# Patient Record
Sex: FEMALE | Race: WHITE | NOT HISPANIC OR LATINO | Employment: OTHER | URBAN - METROPOLITAN AREA
[De-identification: names, ages, dates, MRNs, and addresses within clinical notes are randomized per-mention and may not be internally consistent; named-entity substitution may affect disease eponyms.]

---

## 2019-11-16 ENCOUNTER — APPOINTMENT (EMERGENCY)
Dept: RADIOLOGY | Facility: HOSPITAL | Age: 84
End: 2019-11-16
Payer: MEDICARE

## 2019-11-16 ENCOUNTER — APPOINTMENT (EMERGENCY)
Dept: CT IMAGING | Facility: HOSPITAL | Age: 84
End: 2019-11-16
Payer: MEDICARE

## 2019-11-16 ENCOUNTER — HOSPITAL ENCOUNTER (EMERGENCY)
Facility: HOSPITAL | Age: 84
Discharge: HOME | End: 2019-11-16
Attending: EMERGENCY MEDICINE
Payer: MEDICARE

## 2019-11-16 VITALS
OXYGEN SATURATION: 99 % | SYSTOLIC BLOOD PRESSURE: 198 MMHG | HEIGHT: 62 IN | DIASTOLIC BLOOD PRESSURE: 80 MMHG | BODY MASS INDEX: 19.32 KG/M2 | WEIGHT: 105 LBS | TEMPERATURE: 98.4 F | RESPIRATION RATE: 18 BRPM | HEART RATE: 72 BPM

## 2019-11-16 DIAGNOSIS — S62.109A WRIST FRACTURE: Primary | ICD-10-CM

## 2019-11-16 LAB
ANION GAP SERPL CALCULATED.3IONS-SCNC: 7 MMOL/L (ref 4–13)
APTT PPP: 32 SECONDS (ref 23–37)
ATRIAL RATE: 70 BPM
BASOPHILS # BLD AUTO: 0.1 THOUSANDS/ΜL (ref 0–0.1)
BASOPHILS NFR BLD AUTO: 0 % (ref 0–2)
BUN SERPL-MCNC: 27 MG/DL (ref 7–25)
CALCIUM SERPL-MCNC: 9.4 MG/DL (ref 8.6–10.5)
CHLORIDE SERPL-SCNC: 104 MMOL/L (ref 98–107)
CO2 SERPL-SCNC: 28 MMOL/L (ref 21–31)
CREAT SERPL-MCNC: 0.82 MG/DL (ref 0.6–1.2)
EOSINOPHIL # BLD AUTO: 0.1 THOUSAND/ΜL (ref 0–0.61)
EOSINOPHIL NFR BLD AUTO: 1 % (ref 0–5)
ERYTHROCYTE [DISTWIDTH] IN BLOOD BY AUTOMATED COUNT: 14 % (ref 11.5–14.5)
GFR SERPL CREATININE-BSD FRML MDRD: 63 ML/MIN/1.73SQ M
GLUCOSE SERPL-MCNC: 138 MG/DL (ref 65–99)
HCT VFR BLD AUTO: 41 % (ref 42–47)
HGB BLD-MCNC: 13.5 G/DL (ref 12–16)
INR PPP: 0.98 (ref 0.9–1.5)
LYMPHOCYTES # BLD AUTO: 0.8 THOUSANDS/ΜL (ref 0.6–4.47)
LYMPHOCYTES NFR BLD AUTO: 6 % (ref 21–51)
MCH RBC QN AUTO: 30 PG (ref 26–34)
MCHC RBC AUTO-ENTMCNC: 33 G/DL (ref 31–37)
MCV RBC AUTO: 91 FL (ref 81–99)
MONOCYTES # BLD AUTO: 0.9 THOUSAND/ΜL (ref 0.17–1.22)
MONOCYTES NFR BLD AUTO: 6 % (ref 2–12)
NEUTROPHILS # BLD AUTO: 11.9 THOUSANDS/ΜL (ref 1.4–6.5)
NEUTS SEG NFR BLD AUTO: 87 % (ref 42–75)
P AXIS: 51 DEGREES
PLATELET # BLD AUTO: 284 THOUSANDS/UL (ref 149–390)
PMV BLD AUTO: 7.7 FL (ref 8.6–11.7)
POTASSIUM SERPL-SCNC: 3.6 MMOL/L (ref 3.5–5.5)
PR INTERVAL: 150 MS
PROTHROMBIN TIME: 11.4 SECONDS (ref 10.2–13)
QRS AXIS: -9 DEGREES
QRSD INTERVAL: 66 MS
QT INTERVAL: 386 MS
QTC INTERVAL: 416 MS
RBC # BLD AUTO: 4.52 MILLION/UL (ref 3.9–5.2)
SODIUM SERPL-SCNC: 139 MMOL/L (ref 134–143)
T WAVE AXIS: 60 DEGREES
VENTRICULAR RATE: 70 BPM
WBC # BLD AUTO: 13.6 THOUSAND/UL (ref 4.8–10.8)

## 2019-11-16 PROCEDURE — 80048 BASIC METABOLIC PNL TOTAL CA: CPT | Performed by: EMERGENCY MEDICINE

## 2019-11-16 PROCEDURE — 73110 X-RAY EXAM OF WRIST: CPT

## 2019-11-16 PROCEDURE — 85610 PROTHROMBIN TIME: CPT | Performed by: EMERGENCY MEDICINE

## 2019-11-16 PROCEDURE — 73080 X-RAY EXAM OF ELBOW: CPT

## 2019-11-16 PROCEDURE — 71045 X-RAY EXAM CHEST 1 VIEW: CPT

## 2019-11-16 PROCEDURE — 93005 ELECTROCARDIOGRAM TRACING: CPT

## 2019-11-16 PROCEDURE — 36415 COLL VENOUS BLD VENIPUNCTURE: CPT | Performed by: EMERGENCY MEDICINE

## 2019-11-16 PROCEDURE — 96365 THER/PROPH/DIAG IV INF INIT: CPT

## 2019-11-16 PROCEDURE — 72125 CT NECK SPINE W/O DYE: CPT

## 2019-11-16 PROCEDURE — 85730 THROMBOPLASTIN TIME PARTIAL: CPT | Performed by: EMERGENCY MEDICINE

## 2019-11-16 PROCEDURE — 73030 X-RAY EXAM OF SHOULDER: CPT

## 2019-11-16 PROCEDURE — 93010 ELECTROCARDIOGRAM REPORT: CPT | Performed by: INTERNAL MEDICINE

## 2019-11-16 PROCEDURE — 96375 TX/PRO/DX INJ NEW DRUG ADDON: CPT

## 2019-11-16 PROCEDURE — 70450 CT HEAD/BRAIN W/O DYE: CPT

## 2019-11-16 PROCEDURE — 85025 COMPLETE CBC W/AUTO DIFF WBC: CPT | Performed by: EMERGENCY MEDICINE

## 2019-11-16 PROCEDURE — 99284 EMERGENCY DEPT VISIT MOD MDM: CPT

## 2019-11-16 PROCEDURE — 99285 EMERGENCY DEPT VISIT HI MDM: CPT | Performed by: EMERGENCY MEDICINE

## 2019-11-16 RX ORDER — CEFAZOLIN SODIUM 2 G/50ML
2000 SOLUTION INTRAVENOUS ONCE
Status: COMPLETED | OUTPATIENT
Start: 2019-11-16 | End: 2019-11-16

## 2019-11-16 RX ORDER — CEPHALEXIN 500 MG/1
500 CAPSULE ORAL EVERY 12 HOURS SCHEDULED
Qty: 14 CAPSULE | Refills: 0 | Status: SHIPPED | OUTPATIENT
Start: 2019-11-16 | End: 2019-11-23

## 2019-11-16 RX ADMIN — MORPHINE SULFATE 2 MG: 2 INJECTION, SOLUTION INTRAMUSCULAR; INTRAVENOUS at 06:25

## 2019-11-16 RX ADMIN — MORPHINE SULFATE 2 MG: 2 INJECTION, SOLUTION INTRAMUSCULAR; INTRAVENOUS at 06:13

## 2019-11-16 RX ADMIN — CEFAZOLIN SODIUM 2000 MG: 2 SOLUTION INTRAVENOUS at 06:14

## 2019-11-16 NOTE — ED PROVIDER NOTES
History  Chief Complaint   Patient presents with    Fall     Pt states she slipped and fell while getting up from chair, landing on her RT forearm  Pt c/o pain, swelling, and bleeding of RT wrist and pain of RT forearm  Sp fall from standing, she was reading new paper sand states she tripped over loose papers  She fell on the right  Co right wrist pain and bleeding at wrist  No head trauma, no headache, no sob, no cp  Denies abd pain, back or neck pain, focal neuro sx, uti sx, uri sx, fever  Not anticoagulated  No loc  Prior to Admission Medications   Prescriptions Last Dose Informant Patient Reported? Taking? LOSARTAN POTASSIUM PO 11/15/2019 at Unknown time  Yes Yes   Sig: Take by mouth 2 (two) times a day       Facility-Administered Medications: None       Past Medical History:   Diagnosis Date    Hypertension        Past Surgical History:   Procedure Laterality Date    CHOLECYSTECTOMY      HYSTERECTOMY         History reviewed  No pertinent family history  I have reviewed and agree with the history as documented  Social History     Tobacco Use    Smoking status: Never Smoker    Smokeless tobacco: Never Used   Substance Use Topics    Alcohol use: Never     Frequency: Never    Drug use: Never        Review of Systems   All other systems reviewed and are negative  Physical Exam  Physical Exam   Constitutional: She is oriented to person, place, and time  She appears well-developed and well-nourished  HENT:   Head: Normocephalic and atraumatic  Right Ear: External ear normal    Left Ear: External ear normal    Mouth/Throat: No oropharyngeal exudate  Eyes: Pupils are equal, round, and reactive to light  Conjunctivae and EOM are normal    Neck: Normal range of motion  Neck supple  No JVD present  Cardiovascular: Normal rate, regular rhythm, normal heart sounds and intact distal pulses  Exam reveals no gallop and no friction rub  No murmur heard    Pulmonary/Chest: Effort normal and breath sounds normal  No stridor  No respiratory distress  She has no wheezes  She has no rales  She exhibits no tenderness  Abdominal: Soft  Bowel sounds are normal  She exhibits no distension and no mass  There is no tenderness  There is no rebound and no guarding  No hernia  Musculoskeletal: She exhibits tenderness and deformity  Pelvis NT, chest wall NT, spine NT  There is a deformity to the right wrist, there is a small lac to the lateral wrist that is actively bleeding at site of deformity  bleeding is a ooz  There is no elbow tenderness  The shoulder is uniformly tender, the clavicle is NT  Neurological: She is alert and oriented to person, place, and time  No sensory deficit  She exhibits normal muscle tone  Skin: Skin is warm  Capillary refill takes less than 2 seconds  She is not diaphoretic  Psychiatric: She has a normal mood and affect         Vital Signs  ED Triage Vitals   Temperature Pulse Respirations Blood Pressure SpO2   11/16/19 0434 11/16/19 0434 11/16/19 0434 11/16/19 0439 11/16/19 0434   98 4 °F (36 9 °C) 74 18 (!) 206/82 98 %      Temp Source Heart Rate Source Patient Position - Orthostatic VS BP Location FiO2 (%)   11/16/19 0434 11/16/19 0434 -- 11/16/19 0439 --   Temporal Monitor  Left arm       Pain Score       11/16/19 0434       8           Vitals:    11/16/19 0434 11/16/19 0439 11/16/19 0600 11/16/19 0603   BP:  (!) 206/82 (!) 198/80    Pulse: 74  75 72         Visual Acuity      ED Medications  Medications   ceFAZolin (ANCEF) IVPB (premix) 2,000 mg (0 mg Intravenous Stopped 11/16/19 0645)   morphine injection 2 mg (2 mg Intravenous Given 11/16/19 0613)   morphine injection 2 mg (2 mg Intravenous Given 11/16/19 0625)       Diagnostic Studies  Results Reviewed     Procedure Component Value Units Date/Time    Basic metabolic panel [[id number]]  (Abnormal) Collected:  11/16/19 0511    Lab Status:  Final result Specimen:  Blood from Arm, Left Updated:  11/16/19 0616     Sodium 139 mmol/L      Potassium 3 6 mmol/L      Chloride 104 mmol/L      CO2 28 mmol/L      ANION GAP 7 mmol/L      BUN 27 mg/dL      Creatinine 0 82 mg/dL      Glucose 138 mg/dL      Calcium 9 4 mg/dL      eGFR 63 ml/min/1 73sq m     Narrative:       [place] guidelines for Chronic Kidney Disease (CKD):     Stage 1 with normal or high GFR (GFR > 90 mL/min/1 73 square meters)    Stage 2 Mild CKD (GFR = 60-89 mL/min/1 73 square meters)    Stage 3A Moderate CKD (GFR = 45-59 mL/min/1 73 square meters)    Stage 3B Moderate CKD (GFR = 30-44 mL/min/1 73 square meters)    Stage 4 Severe CKD (GFR = 15-29 mL/min/1 73 square meters)    Stage 5 End Stage CKD (GFR <15 mL/min/1 73 square meters)  Note: GFR calculation is accurate only with a steady state creatinine    CBC and differential [[id number]]  (Abnormal) Collected:  11/16/19 0511    Lab Status:  Final result Specimen:  Blood from Arm, Left Updated:  11/16/19 0541     WBC 13 60 Thousand/uL      RBC 4 52 Million/uL      Hemoglobin 13 5 g/dL      Hematocrit 41 0 %      MCV 91 fL      MCH 30 0 pg      MCHC 33 0 g/dL      RDW 14 0 %      MPV 7 7 fL      Platelets 267 Thousands/uL      Neutrophils Relative 87 %      Lymphocytes Relative 6 %      Monocytes Relative 6 %      Eosinophils Relative 1 %      Basophils Relative 0 %      Neutrophils Absolute 11 90 Thousands/µL      Lymphocytes Absolute 0 80 Thousands/µL      Monocytes Absolute 0 90 Thousand/µL      Eosinophils Absolute 0 10 Thousand/µL      Basophils Absolute 0 10 Thousands/µL     Protime-INR [[id number]]  (Normal) Collected:  11/16/19 0511    Lab Status:  Final result Specimen:  Blood from Arm, Left Updated:  11/16/19 0540     Protime 11 4 seconds      INR 0 98    APTT [[id number]]  (Normal) Collected:  11/16/19 0511    Lab Status:  Final result Specimen:  Blood from Arm, Left Updated:  11/16/19 0540     PTT 32 seconds                  XR wrist 3+ views RIGHT   Final Result by Pleasant Goodpasture,  (11/16 1421)      Stable alignment of the comminuted, impacted distal radius fracture  Workstation performed: [id number]         CT cervical spine without contrast   Final Result by Daisy You DO (11/16 0607)   Advanced degenerative changes of the cervical spine  No acute cervical spine fracture or traumatic malalignment  Workstation performed: [id number]         CT head without contrast   Final Result by Daisy You DO (11/16 3951)   Cerebral atrophy with chronic small vessel ischemic change  No acute intracranial abnormality  Workstation performed: [id number]         XR elbow 3+ views RIGHT   Final Result by Pippa Damon MD (11/16 7407)      No acute osseous abnormality  Workstation performed: [id number]         XR shoulder 2+ views RIGHT   Final Result by Pippa Damon MD (11/16 9845)      No acute osseous abnormality  Workstation performed: [id number]         XR wrist 3+ views RIGHT   Final Result by Pippa Damon MD (55/63 9125)      Complex distal radial fracture  Workstation performed: [id number]         XR chest 1 view portable   Final Result by Pippa Damon MD (14/89 8173)      No acute cardiopulmonary disease  Workstation performed: [id number]                    Procedures  Procedures       ED Course                               MDM  Number of Diagnoses or Management Options  Wrist fracture:   Diagnosis management comments: Pre op ekg- nsr at 70, no obvious / significant st deviation, normal axis and intervals  Non specific t wave flattening  Sp fall from standing, tripped over a new paper  Ct head and neck neg acute  With distal radius fx  There is a small oozing abrasion at the distal lateral wrist and I did consider this a open fx at first  xray reveals the fx and it appears the ooz of blood is alteral on the ulnar side and her fx and deformity are on the radial side   As there is a small wound at / near the deformity I have consulted ortho for further evaluation  Dr Eva Rodrigez has reviewed the films and is aware of clinci situation  She rec IV abx, keflex, splint and reduction via finger trap and states she will see in office  She rec keflex for home  Pain is controlled, abx started, I will give keflex for home  She will fu with ortho  Red flags discussed in detail and she voiced understanding  I will dc pt and I have asked on coming dr zamora to look at the post reduction film prior to her exiting  eER    I am a physician treating a patient in an emergency department  under circumstances when I reasonably  have determined that electronically prescribing a controlled substance would be impractical for the patient to obtain the controlled substance prescribed by electronic prescription or would cause an untimely delay resulting in an adverse impact on the patient's medical condition  norco given for home  Sugar tong placed, neurovascular remains intact  Shoulder pain, sling placed  She will fu with ortho  instructions voiced to family at bed side  Disposition  Final diagnoses:   Wrist fracture     Time reflects when diagnosis was documented in both MDM as applicable and the Disposition within this note     Time User Action Codes Description Comment    11/16/2019  7:20 AM Dylon Giang Add [[id number]] Wrist fracture       ED Disposition     ED Disposition Condition Date/Time Comment    Discharge Stable Sat Nov 16, 2019  7:21 AM Stephanie Herring discharge to home/self care              Follow-up Information     Follow up With Specialties Details Why Contact Info    Nam Cooper MD Orthopedic Surgery Schedule an appointment as soon as possible for a visit in 1 day  [address] [facility]  [phone]            Discharge Medication List as of 11/16/2019  7:37 AM      START taking these medications    Details   cephalexin (KEFLEX) 500 mg capsule Take 1 capsule (500 mg total) by mouth every 12 (twelve) hours for 7 days, Starting Sat 11/16/2019, Until Sat 11/23/2019, Normal         CONTINUE these medications which have NOT CHANGED    Details   LOSARTAN POTASSIUM PO Take by mouth 2 (two) times a day , Historical Med           No discharge procedures on file      ED Provider  Electronically Signed by           Kerrie Olivas MD  11/17/19 9037

## 2019-11-16 NOTE — DISCHARGE INSTRUCTIONS
You must return to er with uncontrolled pain, fever, weakness or sensation changes  Keep splint clean and dry until cleared by ortho, call for appointment  Return to er with inability to see ortho or with any other concerns

## 2019-11-22 ENCOUNTER — OFFICE VISIT (OUTPATIENT)
Dept: OBGYN CLINIC | Facility: CLINIC | Age: 84
End: 2019-11-22
Payer: MEDICARE

## 2019-11-22 ENCOUNTER — TELEPHONE (OUTPATIENT)
Dept: OBGYN CLINIC | Facility: HOSPITAL | Age: 84
End: 2019-11-22

## 2019-11-22 VITALS
HEIGHT: 62 IN | SYSTOLIC BLOOD PRESSURE: 155 MMHG | WEIGHT: 105 LBS | BODY MASS INDEX: 19.32 KG/M2 | DIASTOLIC BLOOD PRESSURE: 82 MMHG | HEART RATE: 76 BPM

## 2019-11-22 DIAGNOSIS — M25.531 RIGHT WRIST PAIN: Primary | ICD-10-CM

## 2019-11-22 DIAGNOSIS — S52.601A CLOSED FRACTURE OF DISTAL ENDS OF RIGHT RADIUS AND ULNA, INITIAL ENCOUNTER: ICD-10-CM

## 2019-11-22 DIAGNOSIS — S52.501A CLOSED FRACTURE OF DISTAL ENDS OF RIGHT RADIUS AND ULNA, INITIAL ENCOUNTER: ICD-10-CM

## 2019-11-22 PROCEDURE — 99203 OFFICE O/P NEW LOW 30 MIN: CPT | Performed by: ORTHOPAEDIC SURGERY

## 2019-11-22 RX ORDER — CEFAZOLIN SODIUM 1 G/50ML
1000 SOLUTION INTRAVENOUS ONCE
Status: CANCELLED | OUTPATIENT
Start: 2019-11-22 | End: 2019-11-22

## 2019-11-22 RX ORDER — OXYCODONE HYDROCHLORIDE AND ACETAMINOPHEN 5; 325 MG/1; MG/1
1 TABLET ORAL EVERY 4 HOURS PRN
Qty: 20 TABLET | Refills: 0 | Status: ON HOLD | OUTPATIENT
Start: 2019-11-22 | End: 2019-12-04 | Stop reason: SDUPTHER

## 2019-11-22 NOTE — PROGRESS NOTES
CHIEF COMPLAINT:  Chief Complaint   Patient presents with    Right Wrist - Pain       SUBJECTIVE:  Theodore Smiley is a 80y o  year old RHD female who presents with right wrist pain  Patient states on 11/16/2019 she had a fall from standing height  She states that she fell onto the outstretched hand  She denies hitting her head or any loss of consciousness  She went to the emergency room on 11/16/2019  She had x-rays which demonstrated a displaced distal radius fracture  She was reduced and was placed into a splint  She was instructed to follow up Orthopedics  Today she presents with pain and swelling over her right distal radius  She has difficulty with any motion  Her pain is constant  It is dull and achy  She denies any numbness or tingling  The patient denies any cardiac or pulmonary issues  Denies diabetes  Denies any history of MI, gastric ulcers, kidney or liver issues  Denies blood thinners  PAST MEDICAL HISTORY:  Past Medical History:   Diagnosis Date    Hypertension        PAST SURGICAL HISTORY:  Past Surgical History:   Procedure Laterality Date    CHOLECYSTECTOMY      HYSTERECTOMY         FAMILY HISTORY:  History reviewed  No pertinent family history      SOCIAL HISTORY:  Social History     Tobacco Use    Smoking status: Never Smoker    Smokeless tobacco: Never Used   Substance Use Topics    Alcohol use: Never     Frequency: Never    Drug use: Never       MEDICATIONS:    Current Outpatient Medications:     cephalexin (KEFLEX) 500 mg capsule, Take 1 capsule (500 mg total) by mouth every 12 (twelve) hours for 7 days, Disp: 14 capsule, Rfl: 0    LOSARTAN POTASSIUM PO, Take by mouth 2 (two) times a day , Disp: , Rfl:     oxyCODONE-acetaminophen (PERCOCET) 5-325 mg per tablet, Take 1 tablet by mouth every 4 (four) hours as needed for moderate painMax Daily Amount: 6 tablets, Disp: 20 tablet, Rfl: 0    ALLERGIES:  No Known Allergies    REVIEW OF SYSTEMS:  Review of Systems  ROS:   General: no fever, no chills  HEENT:  No loss of hearing or eyesight problems  Eyes:  No red eyes  Respiratory:  No coughing, shortness of breath or wheezing  Cardiovascular:  No chest pain, no palpitations  GI:  Abdomen soft nontender, denies nausea  Endocrine:  No muscle weakness, no frequent urination, no excessive thirst  Urinary:  No dysuria, no incontinence  Musculoskeletal: see HPI and PE  SKIN:  No skin rash, no dry skin  Neurological:  No headaches, no confusion  Psychiatric:  No suicide thoughts, no anxiety, no depression  Review of all other systems is negative    VITALS:  Vitals:    11/22/19 1004   BP: 155/82   Pulse: 76       LABS:  HgA1c: No results found for: HGBA1C  BMP:   Lab Results   Component Value Date    CALCIUM 9 4 11/16/2019    K 3 6 11/16/2019    CO2 28 11/16/2019     11/16/2019    BUN 27 (H) 11/16/2019    CREATININE 0 82 11/16/2019       _____________________________________________________  PHYSICAL EXAMINATION:  General: well developed and well nourished, alert, oriented times 3 and appears comfortable  Psychiatric: Normal  HEENT: Trachea Midline, No torticollis  Heart:  Regular rate and rhythm  Lungs:  Clear to auscultation  Pulmonary: No audible wheezing or respiratory distress   Skin: No masses, erythema, lacerations, fluctation, ulcerations  Neurovascular: Sensation Intact to the Median, Ulnar, Radial Nerve, Motor Intact to the Median, Ulnar, Radial Nerve and Pulses Intact    MUSCULOSKELETAL EXAMINATION:  Right wrist  No erythema noted, mild edema and ecchymosis noted over the distal radius, skin is warm to touch  Tender to palpation over the distal radius  She has decreased range of motion strength secondary to fracture  Patient is neurovascularly intact    ___________________________________________________  STUDIES REVIEWED:  Images obtained on 11/16/2019 of the Right hand 3 views demonstrate Displaced, impacted distal radius fracture        PROCEDURES PERFORMED:  Procedures  No Procedures performed today    _____________________________________________________  ASSESSMENT/PLAN:    Right impacted displaced distal radius fracture  - it was discussed with the patient that she will need surgical fixation in order to fix her distal radius fracture  Detail consent was obtained today for right distal radius ORIF   Surgery: right distal radius ORIF   Anesthesia:  Regional with sedation   Antibiotics:  Ordered   Medical clearance: not needed   Hand therapy: ordered   Consent: obtained   Post op pain medication sent to pharmacy today    Surgery medication instructions: You will stop eating and drinking at midnight the night before your surgery, but you may continue to take your normal medications with a small sip of water  In the morning on the day of your surgery, we would like you to take the following medications (as long as you have never been told to avoid Tylenol or NSAIDs like ibuprofen, Naproxen, Aleve, Advil, etc):   Ibuprofen 600mg one tablet by mouth   Tylenol 500mg one tablet by mouth    After surgery, we would like you to take Ibuprofen 600mg one tablet by mouth every 6 hours with food (at breakfast, lunch and dinner)  AND Tylenol 500 mg one tablet by mouth every 6 hours  (at breakfast, lunch and dinner) for 5-7 days after your surgery  Please take these medication EVERYDAY after surgery for 5-7 days, and not just as needed  You can take these medications at the same time  Taking these medications after surgery will limit your need for prescription pain medication  We will also prescribe a narcotic pain medication for a limited time after surgery that you can take as needed for moderate or severe pain         Diagnoses and all orders for this visit:    Right wrist pain  -     XR wrist 3+ vw right; Future    Closed fracture of distal ends of right radius and ulna, initial encounter  -     Ambulatory referral to PT/OT hand therapy; Future  -     Case request operating room: OPEN REDUCTION W/ INTERNAL FIXATION (ORIF) RADIUS right; Standing  -     Case request operating room: OPEN REDUCTION W/ INTERNAL FIXATION (ORIF) RADIUS right  -     oxyCODONE-acetaminophen (PERCOCET) 5-325 mg per tablet; Take 1 tablet by mouth every 4 (four) hours as needed for moderate painMax Daily Amount: 6 tablets    Other orders  -     Diet NPO; Sips with meds; Standing  -     Height and weight upon arrival; Standing  -     Void on call to OR; Standing  -     Insert peripheral IV; Standing  -     ceFAZolin (ANCEF) IVPB (premix) 1,000 mg        Follow Up:  Return for post op   Work/school status:  No use of the right upper extremity    To Do Next Visit:  Re-evaluation of current issue, X-rays of the  right  wrist and Sutures out    Operative Discussions:  Distal Radius Fracture Fixation: Both operative and non operative management of the injury was explained to the patient  The decision was made to undergo surgical fixation  The surgical procedure was explained with a verbal understanding expressed by the patient  Postoperatively, the patient is to begin with occupational therapy to have a removable splint applied  This splint may be removed for showering, bathing, dining, sedative activities (eg watching tv, reading etc ) and daily therapy exercises  Radiographs are typically taken at intervals throughout the fracture healing ensure maintenance of reduction and alignment  If the fracture loses its alignment, revision surgery may be required  Medical conditions such as diabetes, osteoporosis, vitamin D deficiency, and a history of or exposure to smoking may delay or prevent fracture healing    The risks and benefits of the procedure were explained to the patient, which include, but are not limited to: Bleeding, infection, recurrence, pain, scar, malunion, nonunion, damage to tendons, damage to nerves, and damage to blood vessels, and complications related to anesthesia, failure to give desire result, need for more surgery  These risks, along with alternative conservative treatment options, and postoperative protocols were voiced back and understood by the patient  All questions were answered to the patient's satisfaction  The patient agrees to comply with a standard postoperative protocol, and is willing to proceed  Education was provided via written and auditory forms  There were no barriers to learning  Written handouts regarding wound care, incision and scar care, and general preoperative information was provided to the patient  Prior to surgery, the patient may be requested to stop all anti-inflammatory medications  Prophylactic aspirin, Plavix, and Coumadin may be allowed to be continued  Medications including vitamin E , ginkgo, and fish oil are requested to be stopped approximately one week prior to surgery  Hypertensive medications and beta blockers, if taken, should be continued      Scribe Attestation    I,:   Asif Barba PA-C am acting as a scribe while in the presence of the attending physician :        I,:   Deepak Rubin MD personally performed the services described in this documentation    as scribed in my presence :

## 2019-11-22 NOTE — H&P (VIEW-ONLY)
CHIEF COMPLAINT:  Chief Complaint   Patient presents with    Right Wrist - Pain       SUBJECTIVE:  Rafael Breen is a 80y o  year old RHD female who presents with right wrist pain  Patient states on 11/16/2019 she had a fall from standing height  She states that she fell onto the outstretched hand  She denies hitting her head or any loss of consciousness  She went to the emergency room on 11/16/2019  She had x-rays which demonstrated a displaced distal radius fracture  She was reduced and was placed into a splint  She was instructed to follow up Orthopedics  Today she presents with pain and swelling over her right distal radius  She has difficulty with any motion  Her pain is constant  It is dull and achy  She denies any numbness or tingling  The patient denies any cardiac or pulmonary issues  Denies diabetes  Denies any history of MI, gastric ulcers, kidney or liver issues  Denies blood thinners  PAST MEDICAL HISTORY:  Past Medical History:   Diagnosis Date    Hypertension        PAST SURGICAL HISTORY:  Past Surgical History:   Procedure Laterality Date    CHOLECYSTECTOMY      HYSTERECTOMY         FAMILY HISTORY:  History reviewed  No pertinent family history      SOCIAL HISTORY:  Social History     Tobacco Use    Smoking status: Never Smoker    Smokeless tobacco: Never Used   Substance Use Topics    Alcohol use: Never     Frequency: Never    Drug use: Never       MEDICATIONS:    Current Outpatient Medications:     cephalexin (KEFLEX) 500 mg capsule, Take 1 capsule (500 mg total) by mouth every 12 (twelve) hours for 7 days, Disp: 14 capsule, Rfl: 0    LOSARTAN POTASSIUM PO, Take by mouth 2 (two) times a day , Disp: , Rfl:     oxyCODONE-acetaminophen (PERCOCET) 5-325 mg per tablet, Take 1 tablet by mouth every 4 (four) hours as needed for moderate painMax Daily Amount: 6 tablets, Disp: 20 tablet, Rfl: 0    ALLERGIES:  No Known Allergies    REVIEW OF SYSTEMS:  Review of Systems  ROS:   General: no fever, no chills  HEENT:  No loss of hearing or eyesight problems  Eyes:  No red eyes  Respiratory:  No coughing, shortness of breath or wheezing  Cardiovascular:  No chest pain, no palpitations  GI:  Abdomen soft nontender, denies nausea  Endocrine:  No muscle weakness, no frequent urination, no excessive thirst  Urinary:  No dysuria, no incontinence  Musculoskeletal: see HPI and PE  SKIN:  No skin rash, no dry skin  Neurological:  No headaches, no confusion  Psychiatric:  No suicide thoughts, no anxiety, no depression  Review of all other systems is negative    VITALS:  Vitals:    11/22/19 1004   BP: 155/82   Pulse: 76       LABS:  HgA1c: No results found for: HGBA1C  BMP:   Lab Results   Component Value Date    CALCIUM 9 4 11/16/2019    K 3 6 11/16/2019    CO2 28 11/16/2019     11/16/2019    BUN 27 (H) 11/16/2019    CREATININE 0 82 11/16/2019       _____________________________________________________  PHYSICAL EXAMINATION:  General: well developed and well nourished, alert, oriented times 3 and appears comfortable  Psychiatric: Normal  HEENT: Trachea Midline, No torticollis  Heart:  Regular rate and rhythm  Lungs:  Clear to auscultation  Pulmonary: No audible wheezing or respiratory distress   Skin: No masses, erythema, lacerations, fluctation, ulcerations  Neurovascular: Sensation Intact to the Median, Ulnar, Radial Nerve, Motor Intact to the Median, Ulnar, Radial Nerve and Pulses Intact    MUSCULOSKELETAL EXAMINATION:  Right wrist  No erythema noted, mild edema and ecchymosis noted over the distal radius, skin is warm to touch  Tender to palpation over the distal radius  She has decreased range of motion strength secondary to fracture  Patient is neurovascularly intact    ___________________________________________________  STUDIES REVIEWED:  Images obtained on 11/16/2019 of the Right hand 3 views demonstrate Displaced, impacted distal radius fracture        PROCEDURES PERFORMED:  Procedures  No Procedures performed today    _____________________________________________________  ASSESSMENT/PLAN:    Right impacted displaced distal radius fracture  - it was discussed with the patient that she will need surgical fixation in order to fix her distal radius fracture  Detail consent was obtained today for right distal radius ORIF   Surgery: right distal radius ORIF   Anesthesia:  Regional with sedation   Antibiotics:  Ordered   Medical clearance: not needed   Hand therapy: ordered   Consent: obtained   Post op pain medication sent to pharmacy today    Surgery medication instructions: You will stop eating and drinking at midnight the night before your surgery, but you may continue to take your normal medications with a small sip of water  In the morning on the day of your surgery, we would like you to take the following medications (as long as you have never been told to avoid Tylenol or NSAIDs like ibuprofen, Naproxen, Aleve, Advil, etc):   Ibuprofen 600mg one tablet by mouth   Tylenol 500mg one tablet by mouth    After surgery, we would like you to take Ibuprofen 600mg one tablet by mouth every 6 hours with food (at breakfast, lunch and dinner)  AND Tylenol 500 mg one tablet by mouth every 6 hours  (at breakfast, lunch and dinner) for 5-7 days after your surgery  Please take these medication EVERYDAY after surgery for 5-7 days, and not just as needed  You can take these medications at the same time  Taking these medications after surgery will limit your need for prescription pain medication  We will also prescribe a narcotic pain medication for a limited time after surgery that you can take as needed for moderate or severe pain         Diagnoses and all orders for this visit:    Right wrist pain  -     XR wrist 3+ vw right; Future    Closed fracture of distal ends of right radius and ulna, initial encounter  -     Ambulatory referral to PT/OT hand therapy; Future  -     Case request operating room: OPEN REDUCTION W/ INTERNAL FIXATION (ORIF) RADIUS right; Standing  -     Case request operating room: OPEN REDUCTION W/ INTERNAL FIXATION (ORIF) RADIUS right  -     oxyCODONE-acetaminophen (PERCOCET) 5-325 mg per tablet; Take 1 tablet by mouth every 4 (four) hours as needed for moderate painMax Daily Amount: 6 tablets    Other orders  -     Diet NPO; Sips with meds; Standing  -     Height and weight upon arrival; Standing  -     Void on call to OR; Standing  -     Insert peripheral IV; Standing  -     ceFAZolin (ANCEF) IVPB (premix) 1,000 mg        Follow Up:  Return for post op   Work/school status:  No use of the right upper extremity    To Do Next Visit:  Re-evaluation of current issue, X-rays of the  right  wrist and Sutures out    Operative Discussions:  Distal Radius Fracture Fixation: Both operative and non operative management of the injury was explained to the patient  The decision was made to undergo surgical fixation  The surgical procedure was explained with a verbal understanding expressed by the patient  Postoperatively, the patient is to begin with occupational therapy to have a removable splint applied  This splint may be removed for showering, bathing, dining, sedative activities (eg watching tv, reading etc ) and daily therapy exercises  Radiographs are typically taken at intervals throughout the fracture healing ensure maintenance of reduction and alignment  If the fracture loses its alignment, revision surgery may be required  Medical conditions such as diabetes, osteoporosis, vitamin D deficiency, and a history of or exposure to smoking may delay or prevent fracture healing    The risks and benefits of the procedure were explained to the patient, which include, but are not limited to: Bleeding, infection, recurrence, pain, scar, malunion, nonunion, damage to tendons, damage to nerves, and damage to blood vessels, and complications related to anesthesia, failure to give desire result, need for more surgery  These risks, along with alternative conservative treatment options, and postoperative protocols were voiced back and understood by the patient  All questions were answered to the patient's satisfaction  The patient agrees to comply with a standard postoperative protocol, and is willing to proceed  Education was provided via written and auditory forms  There were no barriers to learning  Written handouts regarding wound care, incision and scar care, and general preoperative information was provided to the patient  Prior to surgery, the patient may be requested to stop all anti-inflammatory medications  Prophylactic aspirin, Plavix, and Coumadin may be allowed to be continued  Medications including vitamin E , ginkgo, and fish oil are requested to be stopped approximately one week prior to surgery  Hypertensive medications and beta blockers, if taken, should be continued      Scribe Attestation    I,:   Jo Roberson PA-C am acting as a scribe while in the presence of the attending physician :        I,:   Valentin Prieto MD personally performed the services described in this documentation    as scribed in my presence :

## 2019-11-22 NOTE — H&P
CHIEF COMPLAINT:  Chief Complaint   Patient presents with    Right Wrist - Pain       SUBJECTIVE:  Marie Brewer is a 80y o  year old RHD female who presents with right wrist pain  Patient states on 11/16/2019 she had a fall from standing height  She states that she fell onto the outstretched hand  She denies hitting her head or any loss of consciousness  She went to the emergency room on 11/16/2019  She had x-rays which demonstrated a displaced distal radius fracture  She was reduced and was placed into a splint  She was instructed to follow up Orthopedics  Today she presents with pain and swelling over her right distal radius  She has difficulty with any motion  Her pain is constant  It is dull and achy  She denies any numbness or tingling  The patient denies any cardiac or pulmonary issues  Denies diabetes  Denies any history of MI, gastric ulcers, kidney or liver issues  Denies blood thinners  PAST MEDICAL HISTORY:  Past Medical History:   Diagnosis Date    Hypertension        PAST SURGICAL HISTORY:  Past Surgical History:   Procedure Laterality Date    CHOLECYSTECTOMY      HYSTERECTOMY         FAMILY HISTORY:  History reviewed  No pertinent family history      SOCIAL HISTORY:  Social History     Tobacco Use    Smoking status: Never Smoker    Smokeless tobacco: Never Used   Substance Use Topics    Alcohol use: Never     Frequency: Never    Drug use: Never       MEDICATIONS:    Current Outpatient Medications:     cephalexin (KEFLEX) 500 mg capsule, Take 1 capsule (500 mg total) by mouth every 12 (twelve) hours for 7 days, Disp: 14 capsule, Rfl: 0    LOSARTAN POTASSIUM PO, Take by mouth 2 (two) times a day , Disp: , Rfl:     oxyCODONE-acetaminophen (PERCOCET) 5-325 mg per tablet, Take 1 tablet by mouth every 4 (four) hours as needed for moderate painMax Daily Amount: 6 tablets, Disp: 20 tablet, Rfl: 0    ALLERGIES:  No Known Allergies    REVIEW OF SYSTEMS:  Review of Systems  ROS:   General: no fever, no chills  HEENT:  No loss of hearing or eyesight problems  Eyes:  No red eyes  Respiratory:  No coughing, shortness of breath or wheezing  Cardiovascular:  No chest pain, no palpitations  GI:  Abdomen soft nontender, denies nausea  Endocrine:  No muscle weakness, no frequent urination, no excessive thirst  Urinary:  No dysuria, no incontinence  Musculoskeletal: see HPI and PE  SKIN:  No skin rash, no dry skin  Neurological:  No headaches, no confusion  Psychiatric:  No suicide thoughts, no anxiety, no depression  Review of all other systems is negative    VITALS:  Vitals:    11/22/19 1004   BP: 155/82   Pulse: 76       LABS:  HgA1c: No results found for: HGBA1C  BMP:   Lab Results   Component Value Date    CALCIUM 9 4 11/16/2019    K 3 6 11/16/2019    CO2 28 11/16/2019     11/16/2019    BUN 27 (H) 11/16/2019    CREATININE 0 82 11/16/2019       _____________________________________________________  PHYSICAL EXAMINATION:  General: well developed and well nourished, alert, oriented times 3 and appears comfortable  Psychiatric: Normal  HEENT: Trachea Midline, No torticollis  Heart:  Regular rate and rhythm  Lungs:  Clear to auscultation  Pulmonary: No audible wheezing or respiratory distress   Skin: No masses, erythema, lacerations, fluctation, ulcerations  Neurovascular: Sensation Intact to the Median, Ulnar, Radial Nerve, Motor Intact to the Median, Ulnar, Radial Nerve and Pulses Intact    MUSCULOSKELETAL EXAMINATION:  Right wrist  No erythema noted, mild edema and ecchymosis noted over the distal radius, skin is warm to touch  Tender to palpation over the distal radius  She has decreased range of motion strength secondary to fracture  Patient is neurovascularly intact    ___________________________________________________  STUDIES REVIEWED:  Images obtained on 11/16/2019 of the Right hand 3 views demonstrate Displaced, impacted distal radius fracture        PROCEDURES PERFORMED:  Procedures  No Procedures performed today    _____________________________________________________  ASSESSMENT/PLAN:    Right impacted displaced distal radius fracture  - it was discussed with the patient that she will need surgical fixation in order to fix her distal radius fracture  Detail consent was obtained today for right distal radius ORIF   Surgery: right distal radius ORIF   Anesthesia:  Regional with sedation   Antibiotics:  Ordered   Medical clearance: not needed   Hand therapy: ordered   Consent: obtained   Post op pain medication sent to pharmacy today    Surgery medication instructions: You will stop eating and drinking at midnight the night before your surgery, but you may continue to take your normal medications with a small sip of water  In the morning on the day of your surgery, we would like you to take the following medications (as long as you have never been told to avoid Tylenol or NSAIDs like ibuprofen, Naproxen, Aleve, Advil, etc):   Ibuprofen 600mg one tablet by mouth   Tylenol 500mg one tablet by mouth    After surgery, we would like you to take Ibuprofen 600mg one tablet by mouth every 6 hours with food (at breakfast, lunch and dinner)  AND Tylenol 500 mg one tablet by mouth every 6 hours  (at breakfast, lunch and dinner) for 5-7 days after your surgery  Please take these medication EVERYDAY after surgery for 5-7 days, and not just as needed  You can take these medications at the same time  Taking these medications after surgery will limit your need for prescription pain medication  We will also prescribe a narcotic pain medication for a limited time after surgery that you can take as needed for moderate or severe pain         Diagnoses and all orders for this visit:    Right wrist pain  -     XR wrist 3+ vw right; Future    Closed fracture of distal ends of right radius and ulna, initial encounter  -     Ambulatory referral to PT/OT hand therapy; Future  -     Case request operating room: OPEN REDUCTION W/ INTERNAL FIXATION (ORIF) RADIUS right; Standing  -     Case request operating room: OPEN REDUCTION W/ INTERNAL FIXATION (ORIF) RADIUS right  -     oxyCODONE-acetaminophen (PERCOCET) 5-325 mg per tablet; Take 1 tablet by mouth every 4 (four) hours as needed for moderate painMax Daily Amount: 6 tablets    Other orders  -     Diet NPO; Sips with meds; Standing  -     Height and weight upon arrival; Standing  -     Void on call to OR; Standing  -     Insert peripheral IV; Standing  -     ceFAZolin (ANCEF) IVPB (premix) 1,000 mg        Follow Up:  Return for post op   Work/school status:  No use of the right upper extremity    To Do Next Visit:  Re-evaluation of current issue      Operative Discussions:  Distal Radius Fracture Fixation: Both operative and non operative management of the injury was explained to the patient  The decision was made to undergo surgical fixation  The surgical procedure was explained with a verbal understanding expressed by the patient  Postoperatively, the patient is to begin with occupational therapy to have a removable splint applied  This splint may be removed for showering, bathing, dining, sedative activities (eg watching tv, reading etc ) and daily therapy exercises  Radiographs are typically taken at intervals throughout the fracture healing ensure maintenance of reduction and alignment  If the fracture loses its alignment, revision surgery may be required  Medical conditions such as diabetes, osteoporosis, vitamin D deficiency, and a history of or exposure to smoking may delay or prevent fracture healing    The risks and benefits of the procedure were explained to the patient, which include, but are not limited to: Bleeding, infection, recurrence, pain, scar, malunion, nonunion, damage to tendons, damage to nerves, and damage to blood vessels, and complications related to anesthesia, failure to give desire result, need for more surgery  These risks, along with alternative conservative treatment options, and postoperative protocols were voiced back and understood by the patient  All questions were answered to the patient's satisfaction  The patient agrees to comply with a standard postoperative protocol, and is willing to proceed  Education was provided via written and auditory forms  There were no barriers to learning  Written handouts regarding wound care, incision and scar care, and general preoperative information was provided to the patient  Prior to surgery, the patient may be requested to stop all anti-inflammatory medications  Prophylactic aspirin, Plavix, and Coumadin may be allowed to be continued  Medications including vitamin E , ginkgo, and fish oil are requested to be stopped approximately one week prior to surgery  Hypertensive medications and beta blockers, if taken, should be continued      Scribe Attestation    I,:   Kristian Celestin PA-C am acting as a scribe while in the presence of the attending physician :        I,:   Virgil Dubon MD personally performed the services described in this documentation    as scribed in my presence :

## 2019-11-22 NOTE — TELEPHONE ENCOUNTER
Ciara Ruvalcaba, [facility]  Call back number: [phone]  Patient;s doctor: Dr Xiomara Zurita     Patient is currently taking norco  Do you still want the percocet filled?

## 2019-11-25 ENCOUNTER — ANESTHESIA EVENT (OUTPATIENT)
Dept: PERIOP | Facility: HOSPITAL | Age: 84
End: 2019-11-25
Payer: MEDICARE

## 2019-11-25 NOTE — PRE-PROCEDURE INSTRUCTIONS
Pre-Surgery Instructions:   Medication Instructions    LOSARTAN POTASSIUM PO Instructed patient per Anesthesia Guidelines

## 2019-11-25 NOTE — TELEPHONE ENCOUNTER
Spoke to pharmacist  Pt is not taking norco  Pt had a 2 day prescription from ED the day of fall 11/16/19  Advised to fill medication

## 2019-11-26 ENCOUNTER — ANESTHESIA (OUTPATIENT)
Dept: PERIOP | Facility: HOSPITAL | Age: 84
End: 2019-11-26
Payer: MEDICARE

## 2019-11-26 ENCOUNTER — APPOINTMENT (OUTPATIENT)
Dept: RADIOLOGY | Facility: HOSPITAL | Age: 84
End: 2019-11-26
Payer: MEDICARE

## 2019-11-26 ENCOUNTER — HOSPITAL ENCOUNTER (OUTPATIENT)
Facility: HOSPITAL | Age: 84
Setting detail: OUTPATIENT SURGERY
Discharge: HOME | End: 2019-11-26
Attending: ORTHOPAEDIC SURGERY | Admitting: ORTHOPAEDIC SURGERY
Payer: MEDICARE

## 2019-11-26 VITALS
BODY MASS INDEX: 19.14 KG/M2 | WEIGHT: 104 LBS | TEMPERATURE: 97.9 F | DIASTOLIC BLOOD PRESSURE: 79 MMHG | OXYGEN SATURATION: 96 % | RESPIRATION RATE: 20 BRPM | HEIGHT: 62 IN | HEART RATE: 71 BPM | SYSTOLIC BLOOD PRESSURE: 170 MMHG

## 2019-11-26 DIAGNOSIS — S52.601A CLOSED FRACTURE OF DISTAL ENDS OF RIGHT RADIUS AND ULNA, INITIAL ENCOUNTER: Primary | ICD-10-CM

## 2019-11-26 DIAGNOSIS — S52.501A CLOSED FRACTURE OF DISTAL ENDS OF RIGHT RADIUS AND ULNA, INITIAL ENCOUNTER: Primary | ICD-10-CM

## 2019-11-26 PROCEDURE — C1713 ANCHOR/SCREW BN/BN,TIS/BN: HCPCS | Performed by: ORTHOPAEDIC SURGERY

## 2019-11-26 PROCEDURE — 25607 OPTX DST RD XARTC FX/EPI SEP: CPT | Performed by: ORTHOPAEDIC SURGERY

## 2019-11-26 PROCEDURE — 73100 X-RAY EXAM OF WRIST: CPT

## 2019-11-26 PROCEDURE — 25607 OPTX DST RD XARTC FX/EPI SEP: CPT | Performed by: PHYSICIAN ASSISTANT

## 2019-11-26 PROCEDURE — C1769 GUIDE WIRE: HCPCS | Performed by: ORTHOPAEDIC SURGERY

## 2019-11-26 DEVICE — ACU-LOC® 2 VDR PLT, NARROW, R
Type: IMPLANTABLE DEVICE | Site: WRIST | Status: FUNCTIONAL
Brand: ACUMED

## 2019-11-26 DEVICE — 3.5MM X 12.0MM CORTICAL SCREW
Type: IMPLANTABLE DEVICE | Site: WRIST | Status: FUNCTIONAL
Brand: ACUMED

## 2019-11-26 DEVICE — 2.3MM X 18MM LOCKING CORTICAL SCREW
Type: IMPLANTABLE DEVICE | Site: WRIST | Status: FUNCTIONAL
Brand: ACUMED

## 2019-11-26 DEVICE — 2.3MM X 20MM LOCKING CORTICAL SCREW
Type: IMPLANTABLE DEVICE | Site: WRIST | Status: FUNCTIONAL
Brand: ACUMED

## 2019-11-26 DEVICE — 2.3MM X 18MM LOCKING CORTICAL PEG
Type: IMPLANTABLE DEVICE | Site: WRIST | Status: FUNCTIONAL
Brand: ACUMED

## 2019-11-26 DEVICE — 3.5MM X 12.0MM LOCKING CORTICAL SCREW
Type: IMPLANTABLE DEVICE | Site: WRIST | Status: FUNCTIONAL
Brand: ACUMED

## 2019-11-26 DEVICE — 2.3MM X 16MM LOCKING CORTICAL PEG
Type: IMPLANTABLE DEVICE | Site: WRIST | Status: FUNCTIONAL
Brand: ACUMED

## 2019-11-26 DEVICE — 3.5MM X 14.0MM LOCKING CORTICAL SCREW
Type: IMPLANTABLE DEVICE | Site: WRIST | Status: FUNCTIONAL
Brand: ACUMED

## 2019-11-26 DEVICE — C-WIRE PAK DOUBLE ENDED ORTHOPAEDIC WIRE, SPADE, .062" (1.57 MM)
Type: IMPLANTABLE DEVICE | Site: WRIST | Status: FUNCTIONAL
Brand: C-WIRE

## 2019-11-26 RX ORDER — LIDOCAINE HYDROCHLORIDE 10 MG/ML
0.5 INJECTION, SOLUTION EPIDURAL; INFILTRATION; INTRACAUDAL; PERINEURAL ONCE AS NEEDED
Status: DISCONTINUED | OUTPATIENT
Start: 2019-11-26 | End: 2019-11-26

## 2019-11-26 RX ORDER — SODIUM CHLORIDE, SODIUM LACTATE, POTASSIUM CHLORIDE, CALCIUM CHLORIDE 600; 310; 30; 20 MG/100ML; MG/100ML; MG/100ML; MG/100ML
125 INJECTION, SOLUTION INTRAVENOUS CONTINUOUS
Status: DISCONTINUED | OUTPATIENT
Start: 2019-11-26 | End: 2019-11-26 | Stop reason: HOSPADM

## 2019-11-26 RX ORDER — PROMETHAZINE HYDROCHLORIDE 25 MG/ML
12.5 INJECTION, SOLUTION INTRAMUSCULAR; INTRAVENOUS ONCE AS NEEDED
Status: DISCONTINUED | OUTPATIENT
Start: 2019-11-26 | End: 2019-11-26 | Stop reason: HOSPADM

## 2019-11-26 RX ORDER — ONDANSETRON 2 MG/ML
4 INJECTION INTRAMUSCULAR; INTRAVENOUS EVERY 6 HOURS PRN
Status: DISCONTINUED | OUTPATIENT
Start: 2019-11-26 | End: 2019-11-26 | Stop reason: HOSPADM

## 2019-11-26 RX ORDER — PROPOFOL 10 MG/ML
INJECTION, EMULSION INTRAVENOUS CONTINUOUS PRN
Status: DISCONTINUED | OUTPATIENT
Start: 2019-11-26 | End: 2019-11-26 | Stop reason: SURG

## 2019-11-26 RX ORDER — ONDANSETRON 2 MG/ML
4 INJECTION INTRAMUSCULAR; INTRAVENOUS ONCE AS NEEDED
Status: DISCONTINUED | OUTPATIENT
Start: 2019-11-26 | End: 2019-11-26 | Stop reason: HOSPADM

## 2019-11-26 RX ORDER — ALBUTEROL SULFATE 2.5 MG/3ML
2.5 SOLUTION RESPIRATORY (INHALATION) ONCE AS NEEDED
Status: DISCONTINUED | OUTPATIENT
Start: 2019-11-26 | End: 2019-11-26 | Stop reason: HOSPADM

## 2019-11-26 RX ORDER — ACETAMINOPHEN 325 MG/1
650 TABLET ORAL EVERY 6 HOURS PRN
Status: DISCONTINUED | OUTPATIENT
Start: 2019-11-26 | End: 2019-11-26 | Stop reason: HOSPADM

## 2019-11-26 RX ORDER — TRAMADOL HYDROCHLORIDE 50 MG/1
50 TABLET ORAL EVERY 6 HOURS PRN
Status: DISCONTINUED | OUTPATIENT
Start: 2019-11-26 | End: 2019-11-26 | Stop reason: HOSPADM

## 2019-11-26 RX ORDER — HYDROMORPHONE HCL/PF 1 MG/ML
0.5 SYRINGE (ML) INJECTION
Status: DISCONTINUED | OUTPATIENT
Start: 2019-11-26 | End: 2019-11-26 | Stop reason: HOSPADM

## 2019-11-26 RX ORDER — MEPERIDINE HYDROCHLORIDE 50 MG/ML
12.5 INJECTION INTRAMUSCULAR; INTRAVENOUS; SUBCUTANEOUS AS NEEDED
Status: DISCONTINUED | OUTPATIENT
Start: 2019-11-26 | End: 2019-11-26 | Stop reason: HOSPADM

## 2019-11-26 RX ORDER — ROPIVACAINE HYDROCHLORIDE 5 MG/ML
INJECTION, SOLUTION EPIDURAL; INFILTRATION; PERINEURAL
Status: COMPLETED | OUTPATIENT
Start: 2019-11-26 | End: 2019-11-26

## 2019-11-26 RX ORDER — CEFAZOLIN SODIUM 1 G/50ML
1000 SOLUTION INTRAVENOUS ONCE
Status: DISCONTINUED | OUTPATIENT
Start: 2019-11-26 | End: 2019-11-26

## 2019-11-26 RX ORDER — LIDOCAINE HYDROCHLORIDE 20 MG/ML
INJECTION, SOLUTION INFILTRATION; PERINEURAL AS NEEDED
Status: DISCONTINUED | OUTPATIENT
Start: 2019-11-26 | End: 2019-11-26 | Stop reason: SURG

## 2019-11-26 RX ORDER — FENTANYL CITRATE/PF 50 MCG/ML
25 SYRINGE (ML) INJECTION
Status: DISCONTINUED | OUTPATIENT
Start: 2019-11-26 | End: 2019-11-26 | Stop reason: HOSPADM

## 2019-11-26 RX ORDER — LABETALOL 20 MG/4 ML (5 MG/ML) INTRAVENOUS SYRINGE
5
Status: DISCONTINUED | OUTPATIENT
Start: 2019-11-26 | End: 2019-11-26 | Stop reason: HOSPADM

## 2019-11-26 RX ORDER — MAGNESIUM HYDROXIDE 1200 MG/15ML
LIQUID ORAL AS NEEDED
Status: DISCONTINUED | OUTPATIENT
Start: 2019-11-26 | End: 2019-11-26 | Stop reason: HOSPADM

## 2019-11-26 RX ORDER — PROPOFOL 10 MG/ML
INJECTION, EMULSION INTRAVENOUS AS NEEDED
Status: DISCONTINUED | OUTPATIENT
Start: 2019-11-26 | End: 2019-11-26 | Stop reason: SURG

## 2019-11-26 RX ADMIN — LIDOCAINE HYDROCHLORIDE 50 ML: 20 INJECTION, SOLUTION INFILTRATION; PERINEURAL at 09:34

## 2019-11-26 RX ADMIN — SODIUM CHLORIDE, SODIUM LACTATE, POTASSIUM CHLORIDE, AND CALCIUM CHLORIDE: .6; .31; .03; .02 INJECTION, SOLUTION INTRAVENOUS at 09:25

## 2019-11-26 RX ADMIN — PROPOFOL 30 MCG/KG/MIN: 10 INJECTION, EMULSION INTRAVENOUS at 09:36

## 2019-11-26 RX ADMIN — ROPIVACAINE HYDROCHLORIDE 25 ML: 5 INJECTION, SOLUTION EPIDURAL; INFILTRATION; PERINEURAL at 09:11

## 2019-11-26 RX ADMIN — PROPOFOL 50 MG: 10 INJECTION, EMULSION INTRAVENOUS at 09:34

## 2019-11-26 NOTE — SOCIAL WORK
TORSTEN answered call from Juana Coulter at [facility]  Juana Coulter is the assigned CW to the case  TORSTEN reviewed information provided by  staff as well at pt's family  Juana Coulter agreed  Home visit to be conducted  Juana Coulter will reach out to family in order to facilitate a time where family can be present for in-home assessment
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
## 2019-11-26 NOTE — ANESTHESIA PREPROCEDURE EVALUATION
Review of Systems/Medical History  Patient summary reviewed        Cardiovascular  Negative cardio ROS Hypertension ,    Pulmonary  Negative pulmonary ROS        GI/Hepatic  Negative GI/hepatic ROS          Negative  ROS        Endo/Other  Negative endo/other ROS      GYN  Negative gynecology ROS          Hematology  Negative hematology ROS      Musculoskeletal  Negative musculoskeletal ROS        Neurology  Negative neurology ROS      Psychology   Negative psychology ROS              Physical Exam    Airway    Mallampati score: II  TM Distance: >3 FB  Neck ROM: full     Dental       Cardiovascular  Comment: Negative ROS,     Pulmonary      Other Findings        Anesthesia Plan  ASA Score- 2     Anesthesia Type- IV sedation with anesthesia with ASA Monitors  Additional Monitors:   Airway Plan:     Comment: I have seen the patient and reviewed the history  Patient to receive IV sedation with full ASA monitors  SC block for post op pain control  Risks discussed with the patient, consent signed        Plan Factors-    Induction- intravenous  Postoperative Plan-     Informed Consent- Anesthetic plan and risks discussed with patient  I personally reviewed this patient with the CRNA  Discussed and agreed on the Anesthesia Plan with the CRNA  Fredrick Betts

## 2019-11-26 NOTE — SOCIAL WORK
ICU RN contacted CM for consult as pt is having OP Procedure-ORIF with plan to return home and will need assistance with ADL's  Pt was independent pre-op  CM discussed [facility] setup  Per RN pt is in OR  CM requested RN s/w provider and requested ambulatory home health referral  CM sent referral to [facility]  CM requested notification when pt is in pacu as CM Dept will talk to pt prior to dc

## 2019-11-26 NOTE — DISCHARGE INSTRUCTIONS
Post Operative Instructions    You have had surgery on your arm today, please read and follow the information below:  · Elevate your hand above your elbow during the next 24-48 hours to help with swelling  · Place your hand and arm over your head with motion at your shoulder three times a day  · Do not apply any cream/ointment/oil to your incisions including antibiotics  · Do not soak your hands in standing water (dishwater, tubs, Jacuzzi's, pools, etc ) until given permission (typically 2-3 weeks after injury)    Call the office at [phone]  if you notice any:  · Increased numbness or tingling of your hand or fingers that is not relieved with elevation  · Increasing pain that is not controlled with medication  · Difficulty chewing, breathing, swallowing  · Chest pains or shortness of breath  · Fever over 101 4 degrees  Bandage: Your therapist will remove your bandage at your first therapy appointment  Motion: Move fingers into a fist 5 times a day, DO NOT move any splinted fingers  Weight bearing status: The operated extremity should be non-weight bearing until further notice  Ice: Ice for 10 minutes every hour as needed for swelling x 24 hours  Sling: Sling for comfort for 2-3 days, or until pain block wears off  Pain medication: A prescription for pain medication was provided in the office and sent to your pharmacy  Your prescription pain medication also contains Tylenol  Please limit total Tylenol dose to under 3,000 mg a day  After surgery, we would like you to take Ibuprofen 600mg one tablet by mouth every 6 hours with food (at breakfast, lunch and dinner)  AND Tylenol 500 mg one tablet by mouth every 6 hours  (at breakfast, lunch and dinner) for 5-7 days after your surgery  Please take these medication EVERYDAY after surgery for 5-7 days, and not just as needed  You can take these medications at the same time    Taking these medications after surgery will limit your need for prescription pain medication  If the pain becomes severe, and the pain medication is not alleviating symptoms, The greatest source of pain after surgery is usually a tight dressing due to increased swelling after surgery  If the pain becomes severe after surgery, and the patient medication is NOT alleviating the symptoms, the patient should do the following: The patient should  loosen the top dressing (usually coban or an ace bandage) and loosen/cut the rolled gauze beneath  The 4x4 gauze that is directly covering the incision should remain in place  The splint (if the patient has one) should remain in place  The ace bandage/coban can then be replaced on top in a less constrictive manor  If this does not help relieve the pain/numbness in a few hours, the patient should call our office (number listed below)  and we can have them seen in the office for further evaluation  Follow-up Appointment: 7-10 days with Dr Manoj Wood  Occupational Therapy: 11/29/2019  AFTER THE FIRST THERAPY APPOINTMENT, the patient may remove the splint/dressing for showering and clean the incision with soap and water  Keep incision dry after washing  Do not expose the incision to dirty water (oceans, pools, hot tubs, etc)     If you need help scheduling Therapy, you can call [phone]        Please call the office at [phone] if you have any questions or concerns regarding your post-operative care

## 2019-11-26 NOTE — OP NOTE
OPERATIVE REPORT    PATIENT NAME: Dionisio Galvan RECORD NO:  [id number]    PROCEDURE DATE:  19    :  1928    SURGEON:  MARK Lamas , Ph D     Chidi Rather:  Joceline Angelo    No qualified resident was available to assist for this surgery   A Physician Assistant was used to aid in reduction and retraction while the orthopedic hardware was placed      PREOPERATIVE DIAGNOSIS:  right distal radius 1 part extra-articular fracture    POSTOPERATIVE DIAGNOSIS: right distal radius 1 part extra-articular fracture    PROCEDURE PERFORMED:  Open reduction internal fixation of right distal radius    ANESTHESIA:  Regional and conscious sedation    COMPLICATIONS: none    TOURNIQUET TIME:  37 minutes at 250 mmHg     EQUIPMENT USED:  narrow Aculoc plating system     DISPOSITION: Patient was sent to the PACU in stable condition  INDICATIONS: Patient is a 80 y o  female who suffered a right distal radius extra-articular fracture as determined by imaging studies  Surgical intervention was offered and the risks and benefits of open reduction and internal fixation were explained  Consent was obtained for surgical intervention  PROCEDURE:  The patient was identified in the preoperative screening area  Consent was signed and verified after identifying the correct operative site  The patient was taken back to the operating room after receiving axillary block in the pre-op holding area  Regional and conscious sedation was provided  The patients right upper extremity was prepped and draped in normal sterile fashion with chlorhexidine solution  The arm was then elevated and exsanguinated with an Esmarch and tourniquet placed about the brachium was insufflated to 250 mmHg  A volar longitudinal incision was made over the FCR tendon approximately 6-8cm in length ending just proximal to the wrist flexion crease    Subcutaneous tissue was dissected sharply and superficial vessels were cauterized or preserved where possible  The superficial and deep portions of the FCR sheath were incised and the FCR tendon was reflected ulnarly along with the deeper FPL tendon  The pronator quadratus was identified and released off its radial attachment  The fracture site was identified and was irrigated and cleaned of debris  The fracture was then provisionally reduced and held with K-wires  Once provisional reduction was achieved, the narrow Aculoc plate was placed over the fracture site and provisionally stabilized with 0 054 K-wires  Intra-operative fluoroscopic imaging demonstrated good alignment of the fracture fragments and good positioning of the plate  The distal portion of the plate was then secured first  The distal row was filled with 2 4 mm locking pegs and screws of appropriate length  The two styloid holes of the plate were filled with 2 4 mm locking pegs of appropriate length  The temporary K-wires were then removed  Final manipulation of the fracture was then performed prior to securing the plate to the proximal fragment  The plate was secured to the proximal fragment using one 3 5 mm bicortical non-locking screw placed in compression  Two 3 5 mm bicortical locking screws were then placed to lock the final construct  Screws of appropriate length were placed  Fracture stabilization was assessed and found to be stable and secure  Final intra-operative fluoroscopic images were obtained demonstrating good reduction of the fracture  and good placement of the hardware  The wound was irrigated with copious amounts of saline solution  The pronator quadratus was then repaired back to the radial margin with 3-0 Ethibond sutures placed in a figure-8 fashion  Good soft tissue interposition between the plate and flexor tendons was achieved  The tendons were riding over soft tissue and not in direct contact with the plate      The Tourniquet was released at approximately 37 minutes with good capillary refill and color in the digits  Hemostasis was achieved  The skin was then closed with 4-0 nylon suture in a horizontal mattress fashion  The wound was dressed in a sterile dressing and the wrist was immobilized in a volar wrist splint  The patient tolerated the procedure well, was awakened in the operating room, and sent to the PACU in stable condition  As no first assistant was provided by the hospital, it was elected to use a physician's assistant to stabilize the extremity and aid in instrumentation           Logan Villareal MD 11/26/19 10:21 AM

## 2019-11-26 NOTE — INTERVAL H&P NOTE
H&P reviewed  After examining the patient I find no changes in the patients condition since the H&P had been written      Vitals:    11/26/19 0859   BP: (!) 226/95   Pulse: 81   Resp: 19   Temp: 99 1 °F (37 3 °C)   SpO2: 100%

## 2019-11-26 NOTE — ANESTHESIA PROCEDURE NOTES
Peripheral Block    Patient location during procedure: holding area  Start time: 11/26/2019 9:00 AM  Reason for block: at surgeon's request and post-op pain management  Staffing  Anesthesiologist: Helga Yu MD  Performed: anesthesiologist   Preanesthetic Checklist  Completed: patient identified, site marked, surgical consent, pre-op evaluation, timeout performed, IV checked, risks and benefits discussed and monitors and equipment checked  Peripheral Block  Patient position: supine  Prep: ChloraPrep  Patient monitoring: continuous pulse ox, frequent blood pressure checks, cardiac monitor and heart rate  Block type: supraclavicular  Laterality: right  Injection technique: single-shot  Procedures: ultrasound guided, Ultrasound guidance required for the procedure to increase accuracy and safety of medication placement and decrease risk of complications    Ultrasound permanent image savedropivacaine (NAROPIN) 0 5 % perineural infiltration, 25 mL  Needle  Needle type: Stimuplex   Needle gauge: 22 G  Needle length: 10 cm  Needle localization: ultrasound guidance  Needle insertion depth: 2 cm  Test dose: negative  Assessment  Injection assessment: incremental injection, local visualized surrounding nerve on ultrasound, negative aspiration for heme and no paresthesia on injection  Paresthesia pain: none  Heart rate change: no  Slow fractionated injection: yes  Post-procedure:  site cleaned  patient tolerated the procedure well with no immediate complications

## 2019-11-26 NOTE — ANESTHESIA POSTPROCEDURE EVALUATION
Post-Op Assessment Note    CV Status:  Stable  Pain Score: 0    Pain management: adequate     Mental Status:  Sleepy   Hydration Status:  Stable   PONV Controlled:  None   Airway Patency:  Patent and adequate   Post Op Vitals Reviewed: Yes      Staff: CRNA           BP  136/62   Temp   99 1   Pulse  60   Resp   16   SpO2 98%
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
## 2019-11-28 ENCOUNTER — HOSPITAL ENCOUNTER (INPATIENT)
Facility: HOSPITAL | Age: 84
LOS: 5 days | Discharge: NURSING, REHAB OR LONG-TERM CARE | DRG: 884 | End: 2019-12-04
Attending: EMERGENCY MEDICINE | Admitting: HOSPITALIST
Payer: MEDICARE

## 2019-11-28 DIAGNOSIS — M79.89 SWELLING OF LEFT HAND: Primary | ICD-10-CM

## 2019-11-28 DIAGNOSIS — S52.501A CLOSED FRACTURE OF DISTAL ENDS OF RIGHT RADIUS AND ULNA, INITIAL ENCOUNTER: ICD-10-CM

## 2019-11-28 DIAGNOSIS — S52.601A CLOSED FRACTURE OF DISTAL ENDS OF RIGHT RADIUS AND ULNA, INITIAL ENCOUNTER: ICD-10-CM

## 2019-11-28 DIAGNOSIS — M79.89 SWELLING OF RIGHT HAND: ICD-10-CM

## 2019-11-28 PROBLEM — Z74.09 IMPAIRED MOBILITY AND ADLS: Status: ACTIVE | Noted: 2019-11-28

## 2019-11-28 PROBLEM — I10 BENIGN ESSENTIAL HTN: Chronic | Status: ACTIVE | Noted: 2019-11-28

## 2019-11-28 PROBLEM — Z78.9 IMPAIRED MOBILITY AND ADLS: Status: ACTIVE | Noted: 2019-11-28

## 2019-11-28 LAB
ALBUMIN SERPL BCP-MCNC: 3.9 G/DL (ref 3.5–5.7)
ALP SERPL-CCNC: 46 U/L (ref 55–165)
ALT SERPL W P-5'-P-CCNC: 10 U/L (ref 7–52)
ANION GAP SERPL CALCULATED.3IONS-SCNC: 7 MMOL/L (ref 4–13)
AST SERPL W P-5'-P-CCNC: 16 U/L (ref 13–39)
BASOPHILS # BLD AUTO: 0.1 THOUSANDS/ΜL (ref 0–0.1)
BASOPHILS NFR BLD AUTO: 1 % (ref 0–2)
BILIRUB SERPL-MCNC: 0.4 MG/DL (ref 0.2–1)
BUN SERPL-MCNC: 27 MG/DL (ref 7–25)
CALCIUM SERPL-MCNC: 9 MG/DL (ref 8.6–10.5)
CHLORIDE SERPL-SCNC: 104 MMOL/L (ref 98–107)
CO2 SERPL-SCNC: 29 MMOL/L (ref 21–31)
CREAT SERPL-MCNC: 0.86 MG/DL (ref 0.6–1.2)
EOSINOPHIL # BLD AUTO: 0.1 THOUSAND/ΜL (ref 0–0.61)
EOSINOPHIL NFR BLD AUTO: 1 % (ref 0–5)
ERYTHROCYTE [DISTWIDTH] IN BLOOD BY AUTOMATED COUNT: 14 % (ref 11.5–14.5)
ERYTHROCYTE [SEDIMENTATION RATE] IN BLOOD: 28 MM/HOUR (ref 0–30)
GFR SERPL CREATININE-BSD FRML MDRD: 59 ML/MIN/1.73SQ M
GLUCOSE SERPL-MCNC: 117 MG/DL (ref 65–99)
HCT VFR BLD AUTO: 38.6 % (ref 42–47)
HGB BLD-MCNC: 13.5 G/DL (ref 12–16)
LYMPHOCYTES # BLD AUTO: 1.4 THOUSANDS/ΜL (ref 0.6–4.47)
LYMPHOCYTES NFR BLD AUTO: 16 % (ref 21–51)
MCH RBC QN AUTO: 30.5 PG (ref 26–34)
MCHC RBC AUTO-ENTMCNC: 34.8 G/DL (ref 31–37)
MCV RBC AUTO: 88 FL (ref 81–99)
MONOCYTES # BLD AUTO: 0.8 THOUSAND/ΜL (ref 0.17–1.22)
MONOCYTES NFR BLD AUTO: 9 % (ref 2–12)
NEUTROPHILS # BLD AUTO: 6.6 THOUSANDS/ΜL (ref 1.4–6.5)
NEUTS SEG NFR BLD AUTO: 73 % (ref 42–75)
PLATELET # BLD AUTO: 386 THOUSANDS/UL (ref 149–390)
PMV BLD AUTO: 6.9 FL (ref 8.6–11.7)
POTASSIUM SERPL-SCNC: 4.2 MMOL/L (ref 3.5–5.5)
PROT SERPL-MCNC: 6.6 G/DL (ref 6.4–8.9)
RBC # BLD AUTO: 4.42 MILLION/UL (ref 3.9–5.2)
SODIUM SERPL-SCNC: 140 MMOL/L (ref 134–143)
WBC # BLD AUTO: 9 THOUSAND/UL (ref 4.8–10.8)

## 2019-11-28 PROCEDURE — 99220 PR INITIAL OBSERVATION CARE/DAY 70 MINUTES: CPT | Performed by: PHYSICIAN ASSISTANT

## 2019-11-28 PROCEDURE — 99285 EMERGENCY DEPT VISIT HI MDM: CPT

## 2019-11-28 PROCEDURE — 99284 EMERGENCY DEPT VISIT MOD MDM: CPT | Performed by: EMERGENCY MEDICINE

## 2019-11-28 PROCEDURE — 85025 COMPLETE CBC W/AUTO DIFF WBC: CPT | Performed by: EMERGENCY MEDICINE

## 2019-11-28 PROCEDURE — 36415 COLL VENOUS BLD VENIPUNCTURE: CPT | Performed by: EMERGENCY MEDICINE

## 2019-11-28 PROCEDURE — 80053 COMPREHEN METABOLIC PANEL: CPT | Performed by: EMERGENCY MEDICINE

## 2019-11-28 PROCEDURE — 85652 RBC SED RATE AUTOMATED: CPT | Performed by: EMERGENCY MEDICINE

## 2019-11-28 PROCEDURE — 1123F ACP DISCUSS/DSCN MKR DOCD: CPT | Performed by: PHYSICIAN ASSISTANT

## 2019-11-28 RX ORDER — LOSARTAN POTASSIUM 50 MG/1
50 TABLET ORAL DAILY
Status: DISCONTINUED | OUTPATIENT
Start: 2019-11-29 | End: 2019-11-28

## 2019-11-28 RX ORDER — OXYCODONE HYDROCHLORIDE AND ACETAMINOPHEN 5; 325 MG/1; MG/1
1 TABLET ORAL EVERY 4 HOURS PRN
Status: DISCONTINUED | OUTPATIENT
Start: 2019-11-28 | End: 2019-12-04 | Stop reason: HOSPADM

## 2019-11-28 RX ORDER — ACETAMINOPHEN 325 MG/1
650 TABLET ORAL EVERY 6 HOURS PRN
Status: DISCONTINUED | OUTPATIENT
Start: 2019-11-28 | End: 2019-12-04 | Stop reason: HOSPADM

## 2019-11-28 RX ORDER — LOSARTAN POTASSIUM 50 MG/1
50 TABLET ORAL DAILY
Status: DISCONTINUED | OUTPATIENT
Start: 2019-11-28 | End: 2019-12-04 | Stop reason: HOSPADM

## 2019-11-28 RX ORDER — ONDANSETRON 2 MG/ML
4 INJECTION INTRAMUSCULAR; INTRAVENOUS EVERY 6 HOURS PRN
Status: DISCONTINUED | OUTPATIENT
Start: 2019-11-28 | End: 2019-12-04 | Stop reason: HOSPADM

## 2019-11-29 ENCOUNTER — APPOINTMENT (OUTPATIENT)
Dept: NON INVASIVE DIAGNOSTICS | Facility: HOSPITAL | Age: 84
DRG: 884 | End: 2019-11-29
Payer: MEDICARE

## 2019-11-29 LAB
ANION GAP SERPL CALCULATED.3IONS-SCNC: 7 MMOL/L (ref 4–13)
BUN SERPL-MCNC: 23 MG/DL (ref 7–25)
CALCIUM SERPL-MCNC: 8.9 MG/DL (ref 8.6–10.5)
CHLORIDE SERPL-SCNC: 106 MMOL/L (ref 98–107)
CO2 SERPL-SCNC: 28 MMOL/L (ref 21–31)
CREAT SERPL-MCNC: 0.88 MG/DL (ref 0.6–1.2)
ERYTHROCYTE [DISTWIDTH] IN BLOOD BY AUTOMATED COUNT: 14 % (ref 11.5–14.5)
GFR SERPL CREATININE-BSD FRML MDRD: 58 ML/MIN/1.73SQ M
GLUCOSE SERPL-MCNC: 103 MG/DL (ref 65–99)
HCT VFR BLD AUTO: 38.1 % (ref 42–47)
HGB BLD-MCNC: 13.2 G/DL (ref 12–16)
MCH RBC QN AUTO: 30.5 PG (ref 26–34)
MCHC RBC AUTO-ENTMCNC: 34.6 G/DL (ref 31–37)
MCV RBC AUTO: 88 FL (ref 81–99)
PLATELET # BLD AUTO: 364 THOUSANDS/UL (ref 149–390)
PMV BLD AUTO: 7.4 FL (ref 8.6–11.7)
POTASSIUM SERPL-SCNC: 4.3 MMOL/L (ref 3.5–5.5)
RBC # BLD AUTO: 4.33 MILLION/UL (ref 3.9–5.2)
SODIUM SERPL-SCNC: 141 MMOL/L (ref 134–143)
WBC # BLD AUTO: 7.1 THOUSAND/UL (ref 4.8–10.8)

## 2019-11-29 PROCEDURE — 93971 EXTREMITY STUDY: CPT

## 2019-11-29 PROCEDURE — 90662 IIV NO PRSV INCREASED AG IM: CPT | Performed by: HOSPITALIST

## 2019-11-29 PROCEDURE — 93971 EXTREMITY STUDY: CPT | Performed by: SURGERY

## 2019-11-29 PROCEDURE — 85027 COMPLETE CBC AUTOMATED: CPT | Performed by: PHYSICIAN ASSISTANT

## 2019-11-29 PROCEDURE — 99232 SBSQ HOSP IP/OBS MODERATE 35: CPT | Performed by: NURSE PRACTITIONER

## 2019-11-29 PROCEDURE — G0008 ADMIN INFLUENZA VIRUS VAC: HCPCS | Performed by: HOSPITALIST

## 2019-11-29 PROCEDURE — 80048 BASIC METABOLIC PNL TOTAL CA: CPT | Performed by: PHYSICIAN ASSISTANT

## 2019-11-29 RX ADMIN — LOSARTAN POTASSIUM 50 MG: 50 TABLET, FILM COATED ORAL at 09:40

## 2019-11-29 RX ADMIN — INFLUENZA A VIRUS A/MICHIGAN/45/2015 X-275 (H1N1) ANTIGEN (FORMALDEHYDE INACTIVATED), INFLUENZA A VIRUS A/SINGAPORE/INFIMH-16-0019/2016 IVR-186 (H3N2) ANTIGEN (FORMALDEHYDE INACTIVATED), AND INFLUENZA B VIRUS B/MARYLAND/15/2016 BX-69A (A B/COLORADO/6/2017-LIKE VIRUS) ANTIGEN (FORMALDEHYDE INACTIVATED) 0.5 ML: 60; 60; 60 INJECTION, SUSPENSION INTRAMUSCULAR at 11:53
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
## 2019-11-29 NOTE — ED PROVIDER NOTES
History  Chief Complaint   Patient presents with    Hand Swelling     This is a 40-year-old female who is brought in by her family and power of  secondary to being unable to care for herself after surgery  Patient has marked swelling in the right upper extremity which is where she received orthopedic surgery approximately 3 days prior  Leora Koch is power of  and is present  She went in to check on her friend and found that she you had not eaten in a day and is having recurrent thoughts that someone is breaking into her home in leaving a mass everywhere  Patient denies having any pain in her right hand but is having marked swelling  Prior to Admission Medications   Prescriptions Last Dose Informant Patient Reported? Taking? LOSARTAN POTASSIUM PO  Self Yes Yes   Sig: Take by mouth 2 (two) times a day    oxyCODONE-acetaminophen (PERCOCET) 5-325 mg per tablet   No No   Sig: Take 1 tablet by mouth every 4 (four) hours as needed for moderate painMax Daily Amount: 6 tablets      Facility-Administered Medications: None       Past Medical History:   Diagnosis Date    Hypertension        Past Surgical History:   Procedure Laterality Date    CHOLECYSTECTOMY      HYSTERECTOMY      OR OPEN RX DISTAL RADIUS FX, INTRA-ARTICULAR, 3+ FRAG Right 11/26/2019    Procedure: RADIUS OPEN REDUCTION W/ INTERNAL FIXATION (ORIF); Surgeon: La Nena Webster MD;  Location: [facility] OR;  Service: Orthopedics       History reviewed  No pertinent family history  I have reviewed and agree with the history as documented  Social History     Tobacco Use    Smoking status: Never Smoker    Smokeless tobacco: Never Used   Substance Use Topics    Alcohol use: Never     Frequency: Never    Drug use: Never        Review of Systems   Constitutional: Negative for activity change, chills, fatigue and fever  HENT: Negative for congestion  Eyes: Negative for visual disturbance     Respiratory: Negative for cough, chest tightness and shortness of breath  Cardiovascular: Negative for chest pain  Gastrointestinal: Negative for abdominal pain, diarrhea and vomiting  Genitourinary: Negative for dysuria  Skin: Negative for rash  Neurological: Negative for dizziness, weakness and numbness  Physical Exam  Physical Exam   Constitutional: She is oriented to person, place, and time  She appears well-developed and well-nourished  HENT:   Head: Normocephalic and atraumatic  Eyes: Pupils are equal, round, and reactive to light  Conjunctivae and EOM are normal    Neck: Normal range of motion  Neck supple  Cardiovascular: Normal rate, regular rhythm and normal heart sounds  Pulmonary/Chest: Effort normal and breath sounds normal  No respiratory distress  Abdominal: Soft  Bowel sounds are normal    Musculoskeletal:   Well-healing incision at the anterior aspect of the right wrist   Market edema from the hand to the tips of the fingers  Good cap refill  Some bruising along the since the arm  Neurological: She is alert and oriented to person, place, and time  Skin: Skin is warm and dry  Capillary refill takes less than 2 seconds  Psychiatric: She has a normal mood and affect   Her behavior is normal        Vital Signs  ED Triage Vitals   Temperature Pulse Respirations Blood Pressure SpO2   11/28/19 1914 11/28/19 1914 11/28/19 1914 11/28/19 1918 11/28/19 1914   99 9 °F (37 7 °C) 78 18 (!) 191/84 98 %      Temp Source Heart Rate Source Patient Position - Orthostatic VS BP Location FiO2 (%)   11/28/19 1914 -- -- -- --   Temporal          Pain Score       11/28/19 1914       3           Vitals:    11/28/19 1914 11/28/19 1918   BP:  (!) 191/84   Pulse: 78          Visual Acuity      ED Medications  Medications - No data to display    Diagnostic Studies  Results Reviewed     Procedure Component Value Units Date/Time    CBC and differential [[id number]]     Lab Status:  No result Specimen:  Blood     Comprehensive metabolic panel [[id number]]     Lab Status:  No result Specimen:  Blood     Sedimentation rate, automated [[id number]]     Lab Status:  No result Specimen:  Blood                  VAS upper limb venous duplex scan, unilateral/limited    (Results Pending)              Procedures  Procedures       ED Course                               MDM  Number of Diagnoses or Management Options  Swelling of left hand: new and requires workup  Diagnosis management comments: This is a 27-year-old female with marked good swelling of the right hand  This is significantly worsened by the patient's dementia and inability to care for herself  Patient is having concerns about people breaking into her home and leaving garbage repeatedly, does not know where she is with that it is a major [place] holiday today  Patient does not seem to be able to care for her wound  Unclear the cause, does not appear infectious  Ultrasound of the right upper extremity ordered and is pending  Review of systems otherwise negative, physical exam findings otherwise negative  Patient is pleasant  Discussed staying inpatient to assist with management of the hand and possible long-term placement  Patient states understanding and agreement with the plan as does the patient's POA who is present  Amount and/or Complexity of Data Reviewed  Clinical lab tests: ordered and reviewed  Tests in the radiology section of CPT®: ordered        Disposition  Final diagnoses:   None     ED Disposition     None      Follow-up Information    None         Patient's Medications   Discharge Prescriptions    No medications on file     No discharge procedures on file      ED Provider  Electronically Signed by           Kostas Hodge MD  11/28/19 0268

## 2019-11-29 NOTE — ASSESSMENT & PLAN NOTE
Patient's family and as well as power-of- voice concerns that is no longer safe for the patient resides at home alone anymore and they are requesting placement  Patient is felt to be in early stages of dementia though no formal evaluation has been completed  Consult placed to case management for same

## 2019-11-29 NOTE — ED NOTES
Pt recently has surgery on R radius, is swollen distally with limited ROM  Per family with pt pt can not live by herself is having dementia    Was seen by visiting nurse this am      Samy Bashir RN  24/51/28 8534

## 2019-11-29 NOTE — ASSESSMENT & PLAN NOTE
· Placed in observation Medicine  · Obtain ultrasound right upper extremity in a m  · Consult Orthopedics  · Give pain control p r n

## 2019-11-29 NOTE — SOCIAL WORK
Visit with patient in her hospital room to initiate discharge planning  Patient lives alone in a 2 story home  She stays on 1st floor, has 3 bathrooms  1 step to enter  Patient goal is to go home  Current with [facility]  Referral made  Spoke with THAIS Calvert via phone  Patient no longer drives  POA would like STR  Explained patient is observation and would be out of pocket  Discussed assisted living  Has dog that is her "anahy" and wants to stay with dog  Niece will transport her home  CM reviewed d/c planning process including the following: identifying help at home, patient preference for d/c planning needs, availability of treatment team to discuss questions or concerns patient and/or family may have regarding understanding medications and recognizing signs and symptoms once discharged  CM also encouraged patient to follow up with all recommended appointments after discharge  Patient advised of importance for patient and family to participate in managing patients medical well being

## 2019-11-29 NOTE — ASSESSMENT & PLAN NOTE
· With suspected baseline cognitive impairment  · Will get PT and OT evaluation  · Per family, the patient was seen by area of aging and was deemed that she is not safe to be home alone    I will discuss the case with case management for discharge disposition

## 2019-11-29 NOTE — PLAN OF CARE
Problem: Potential for Falls  Goal: Patient will remain free of falls  Description  INTERVENTIONS:  - Assess patient frequently for physical needs  -  Identify cognitive and physical deficits and behaviors that affect risk of falls    -  [place] fall precautions as indicated by assessment   - Educate patient/family on patient safety including physical limitations  - Instruct patient to call for assistance with activity based on assessment  - Modify environment to reduce risk of injury  - Consider OT/PT consult to assist with strengthening/mobility  Outcome: Progressing     Problem: PAIN - ADULT  Goal: Verbalizes/displays adequate comfort level or baseline comfort level  Description  Interventions:  - Encourage patient to monitor pain and request assistance  - Assess pain using appropriate pain scale  - Administer analgesics based on type and severity of pain and evaluate response  - Implement non-pharmacological measures as appropriate and evaluate response  - Consider cultural and social influences on pain and pain management  - Notify physician/advanced practitioner if interventions unsuccessful or patient reports new pain  Outcome: Progressing     Problem: INFECTION - ADULT  Goal: Absence or prevention of progression during hospitalization  Description  INTERVENTIONS:  - Assess and monitor for signs and symptoms of infection  - Monitor lab/diagnostic results  - Monitor all insertion sites, i e  indwelling lines, tubes, and drains  - Monitor endotracheal if appropriate and nasal secretions for changes in amount and color  - [place] appropriate cooling/warming therapies per order  - Administer medications as ordered  - Instruct and encourage patient and family to use good hand hygiene technique  - Identify and instruct in appropriate isolation precautions for identified infection/condition  Outcome: Progressing  Goal: Absence of fever/infection during neutropenic period  Description  INTERVENTIONS:  - Monitor WBC    Outcome: Progressing     Problem: SAFETY ADULT  Goal: Patient will remain free of falls  Description  INTERVENTIONS:  - Assess patient frequently for physical needs  -  Identify cognitive and physical deficits and behaviors that affect risk of falls  -  [place] fall precautions as indicated by assessment   - Educate patient/family on patient safety including physical limitations  - Instruct patient to call for assistance with activity based on assessment  - Modify environment to reduce risk of injury  - Consider OT/PT consult to assist with strengthening/mobility  Outcome: Progressing  Goal: Maintain or return to baseline ADL function  Description  INTERVENTIONS:  - Assess patient frequently for physical needs  -  Identify cognitive and physical deficits and behaviors that affect risk of falls    -  [place] fall precautions as indicated by assessment   - Educate patient/family on patient safety including physical limitations  - Instruct patient to call for assistance with activity based on assessment  - Modify environment to reduce risk of injury  - Consider OT/PT consult to assist with strengthening/mobility  Outcome: Progressing  Goal: Maintain or return mobility status to optimal level  Description  INTERVENTIONS:  -  Assess patient's ability to carry out ADLs; assess patient's baseline for ADL function and identify physical deficits which impact ability to perform ADLs (bathing, care of mouth/teeth, toileting, grooming, dressing, etc )  - Assess/evaluate cause of self-care deficits   - Assess range of motion  - Assess patient's mobility; develop plan if impaired  - Assess patient's need for assistive devices and provide as appropriate  - Encourage maximum independence but intervene and supervise when necessary  - Involve family in performance of ADLs  - Assess for home care needs following discharge   - Consider OT consult to assist with ADL evaluation and planning for discharge  - Provide patient education as appropriate  Outcome: Progressing     Problem: DISCHARGE PLANNING  Goal: Discharge to home or other facility with appropriate resources  Description  INTERVENTIONS:  - Identify barriers to discharge w/patient and caregiver  - Arrange for needed discharge resources and transportation as appropriate  - Identify discharge learning needs (meds, wound care, etc )  - Arrange for interpretive services to assist at discharge as needed  - Refer to Case Management Department for coordinating discharge planning if the patient needs post-hospital services based on physician/advanced practitioner order or complex needs related to functional status, cognitive ability, or social support system  Outcome: Progressing     Problem: Knowledge Deficit  Goal: Patient/family/caregiver demonstrates understanding of disease process, treatment plan, medications, and discharge instructions  Description  Complete learning assessment and assess knowledge base    Interventions:  - Provide teaching at level of understanding  - Provide teaching via preferred learning methods  Outcome: Progressing

## 2019-11-29 NOTE — ASSESSMENT & PLAN NOTE
· Venous duplex- no DVT   · Suspected this to be due to post-op edema   · Supportive care   · Elevate extremity  · PT/OT

## 2019-11-29 NOTE — UTILIZATION REVIEW
Initial Clinical Review    Admission: Date/Time/Statement: 11/28 @ 2109 OBSERVATION    Orders Placed This Encounter   Procedures    Place in Observation     Standing Status:   Standing     Number of Occurrences:   1     Order Specific Question:   Admitting Physician     Answer:   Salina Solis [[id number]]     Order Specific Question:   Level of Care     Answer:   Med Surg [[id number]]     ED Arrival Information     Expected Arrival Acuity Means of Arrival Escorted By Service Admission Type    - 11/28/2019 18:59 Urgent Walk-In Family Member General Medicine Urgent    Arrival Complaint    hand swelling post surgery        Chief Complaint   Patient presents with    Hand Swelling     Assessment/Plan: 81 y/o female presents to ED from home with marked RUE swelling s/p ortho surgery 3 days ago  Per power of , pt unable to care for herself, has not eaten in a day, and is having recurrent thought of someone breaking into her house  On exam, well healing incision at anterior aspect of R wrist, marked edema from hand to tips of fingers, limited finger flexion due to swelling, bruising along arm  Admitted as observation  Swelling of right hand  Assessment & Plan  · Placed in observation Medicine  · Obtain ultrasound right upper extremity in a m  · Consult Orthopedics  · Give pain control p r n  Impaired mobility and ADLs  Assessment & Plan  Patient's family and as well as power-of- voice concerns that is no longer safe for the patient resides at home alone anymore and they are requesting placement      Patient is felt to be in early stages of dementia though no formal evaluation has been completed      Consult placed to case management for same  Benign essential HTN  Assessment & Plan  Patient is hypertensive on presentation and reportedly takes Cozaar at home though doses unclear, will give Cozaar 50 mg p o   Daily 1st dose now    ED Triage Vitals   Temperature Pulse Respirations Blood Pressure SpO2 11/28/19 1914 11/28/19 1914 11/28/19 1914 11/28/19 1918 11/28/19 1914   99 9 °F (37 7 °C) 78 18 (!) 191/84 98 %      Temp Source Heart Rate Source Patient Position - Orthostatic VS BP Location FiO2 (%)   11/28/19 1914 11/29/19 0726 11/28/19 2212 11/28/19 2212 --   Temporal Monitor Lying Left arm       Pain Score       11/28/19 1914       3        Wt Readings from Last 1 Encounters:   11/29/19 46 2 kg (101 lb 13 6 oz)     Additional Vital Signs:   11/29/19 0726  98 7 °F (37 1 °C)  69  18  143/67  96 %  None (Room air)  Lying   11/28/19 2212  97 4 °F (36 3 °C)Abnormal   73  18  181/77Abnormal   98 %  None (Room air)  Lying   11/28/19 2111    66  18  136/76  98 %           Pertinent Labs/Diagnostic Test Results:   Results from last 7 days   Lab Units 11/29/19 0626 11/28/19 1945   WBC Thousand/uL 7 10 9 00   HEMOGLOBIN g/dL 13 2 13 5   HEMATOCRIT % 38 1* 38 6*   PLATELETS Thousands/uL 364 386   NEUTROS ABS Thousands/µL  --  6 60*         Results from last 7 days   Lab Units 11/29/19 0626 11/28/19 1945   SODIUM mmol/L 141 140   POTASSIUM mmol/L 4 3 4 2   CHLORIDE mmol/L 106 104   CO2 mmol/L 28 29   ANION GAP mmol/L 7 7   BUN mg/dL 23 27*   CREATININE mg/dL 0 88 0 86   EGFR ml/min/1 73sq m 58 59   CALCIUM mg/dL 8 9 9 0     Results from last 7 days   Lab Units 11/28/19 1945   AST U/L 16   ALT U/L 10   ALK PHOS U/L 46*   TOTAL PROTEIN g/dL 6 6   ALBUMIN g/dL 3 9   TOTAL BILIRUBIN mg/dL 0 40         Results from last 7 days   Lab Units 11/29/19 0626 11/28/19 1945   GLUCOSE RANDOM mg/dL 103* 117*         Results from last 7 days   Lab Units 11/28/19 1945   SED RATE mm/hour 28          UE venous duplex pending      Past Medical History:   Diagnosis Date    Hypertension      Present on Admission:   Swelling of right hand   Closed fracture of right distal radius and ulna   Benign essential HTN   Impaired mobility and ADLs      Admitting Diagnosis: Hand swelling [M79 89]  Swelling of left hand [M79 89]  Age/Sex: 80 y o  female  Admission Orders:  Scheduled Medications:    Medications:  influenza vaccine 0 5 mL Intramuscular Once   losartan 50 mg Oral Daily     Continuous IV Infusions:     PRN Meds:    acetaminophen 650 mg Oral Q6H PRN   ondansetron 4 mg Intravenous Q6H PRN   oxyCODONE-acetaminophen 1 tablet Oral Q4H PRN       IP CONSULT TO CASE MANAGEMENT  IP CONSULT TO CASE MANAGEMENT    Network Utilization Review Department  [email or web address]  ATTENTION: Please call with any questions or concerns to [phone] and carefully listen to the prompts so that you are directed to the right person  All voicemails are confidential   Rachele Shirley all requests for admission clinical reviews, approved or denied determinations and any other requests to dedicated fax number below belonging to the campus where the patient is receiving treatment    FACILITY NAME UR FAX NUMBER   ADMISSION DENIALS (Administrative/Medical Necessity) [address] [facility] (Maternity/NICU/Pediatrics) [phone]   [address] 300 [facility] [phone]   [address] [place] [address] 1525 [facility] [phone]   Lisa Shutter 2000 [facility] 443 [facility] 500 [facility] [phone]

## 2019-11-29 NOTE — ASSESSMENT & PLAN NOTE
Patient is hypertensive on presentation and reportedly takes Cozaar at home though doses unclear, will give Cozaar 50 mg p o   Daily 1st dose now

## 2019-11-29 NOTE — ASSESSMENT & PLAN NOTE
· Patient is hypertensive on presentation and reportedly takes Cozaar at home though doses unclear  · BP is much improved   · Continue with Cozaar

## 2019-11-29 NOTE — PROGRESS NOTES
Progress Note - Lilliana Gerber 1928, 80 y o  female MRN: [id number]    Unit/Bed#: -02 Encounter: [id number]    Primary Care Provider: Melynda Kehr, DO   Date and time admitted to hospital: 2019  7:10 PM        * Swelling of right hand  Assessment & Plan  · Venous duplex- no DVT   · Suspected this to be due to post-op edema   · Supportive care   · Elevate extremity  · PT/OT     Impaired mobility and ADLs  Assessment & Plan  · With suspected baseline cognitive impairment  · Will get PT and OT evaluation  · Per family, the patient was seen by area of aging and was deemed that she is not safe to be home alone  I will discuss the case with case management for discharge disposition     Benign essential HTN  Assessment & Plan  · Patient is hypertensive on presentation and reportedly takes Cozaar at home though doses unclear  · BP is much improved   · Continue with Cozaar     Closed fracture of right distal radius and ulna  Assessment & Plan  · Will treat as per above        VTE Pharmacologic Prophylaxis: Pharmacologic: Enoxaparin (Lovenox)    Patient Centered Rounds: I have performed bedside rounds with nursing staff today  Discussions with Specialists or Other Care Team Provider: nursing, CM, Rx  Education and Discussions with Family / Patient: patient, niece and great niece     Current Length of Stay: 0 day(s)    Current Patient Status: Inpatient   Certification Statement: The patient will continue to require additional inpatient hospital stay due to increasing confusion, was seen by area of aging and deemed not safe at home alone    Discharge Plan: pending discharge disposition     Code Status: Level 1 - Full Code    Subjective:   Feeling fine   "I want to go home"    Objective:     Vitals:   Temp (24hrs), Av 9 °F (37 2 °C), Min:97 4 °F (36 3 °C), Max:99 9 °F (37 7 °C)    Temp:  [97 4 °F (36 3 °C)-99 9 °F (37 7 °C)] 99 4 °F (37 4 °C)  HR:  [66-78] 72  Resp:  [18] 18  BP: (124-191)/(59-84) 124/59  SpO2:  [96 %-98 %] 97 %  Body mass index is 18 63 kg/m²  Input and Output Summary (last 24 hours):     No intake or output data in the 24 hours ending 11/29/19 1825    Physical Exam:     Physical Exam   Constitutional: She appears well-developed and well-nourished  No distress  HENT:   Head: Normocephalic and atraumatic  Mouth/Throat: Oropharynx is clear and moist    Eyes: Pupils are equal, round, and reactive to light  Conjunctivae and EOM are normal    Neck: Normal range of motion  Neck supple  No thyromegaly present  Cardiovascular: Normal rate, regular rhythm and normal heart sounds  Exam reveals no gallop and no friction rub  No murmur heard  Pulmonary/Chest: Effort normal and breath sounds normal  She has no wheezes  She has no rales  Abdominal: Soft  Bowel sounds are normal  She exhibits no distension  There is no tenderness  There is no rebound  Musculoskeletal: Normal range of motion  She exhibits edema (right hand )  She exhibits no tenderness or deformity  Neurological: She is alert  No cranial nerve deficit  With underlying cognitive impairment   Skin: Skin is warm and dry  No rash noted  No erythema  Psychiatric: She has a normal mood and affect  Her behavior is normal    Vitals reviewed  Additional Data:     Labs:    Results from last 7 days   Lab Units 11/29/19 0626 11/28/19 1945   WBC Thousand/uL 7 10 9 00   HEMOGLOBIN g/dL 13 2 13 5   HEMATOCRIT % 38 1* 38 6*   PLATELETS Thousands/uL 364 386   NEUTROS PCT %  --  73   LYMPHS PCT %  --  16*   MONOS PCT %  --  9   EOS PCT %  --  1     Results from last 7 days   Lab Units 11/29/19 0626 11/28/19 1945   POTASSIUM mmol/L 4 3 4 2   CHLORIDE mmol/L 106 104   CO2 mmol/L 28 29   BUN mg/dL 23 27*   CREATININE mg/dL 0 88 0 86   CALCIUM mg/dL 8 9 9 0   ALK PHOS U/L  --  46*   ALT U/L  --  10   AST U/L  --  16                   * I Have Reviewed All Lab Data Listed Above  * Additional Pertinent Lab Tests Reviewed:  All Labs For Current Hospital Admission  Reviewed    Imaging:  Imaging Reports Reviewed Today Include: venous duplex right arm     Recent Cultures (last 7 days):           Last 24 Hours Medication List:     Current Facility-Administered Medications:  acetaminophen 650 mg Oral Q6H PRN Pippa Meier PA-C   losartan 50 mg Oral Daily Pippa Meier PA-C   ondansetron 4 mg Intravenous Q6H PRN Pippa Meier PA-C   oxyCODONE-acetaminophen 1 tablet Oral Q4H PRN Pippa Meier PA-C        Today, Patient Was Seen By: EMELI Amaya    ** Please Note: Dictation voice to text software may have been used in the creation of this document   **

## 2019-11-30 PROCEDURE — 99232 SBSQ HOSP IP/OBS MODERATE 35: CPT | Performed by: NURSE PRACTITIONER

## 2019-11-30 RX ADMIN — LOSARTAN POTASSIUM 50 MG: 50 TABLET, FILM COATED ORAL at 09:55

## 2019-11-30 RX ADMIN — OXYCODONE HYDROCHLORIDE AND ACETAMINOPHEN 1 TABLET: 5; 325 TABLET ORAL at 06:10

## 2019-11-30 RX ADMIN — ENOXAPARIN SODIUM 30 MG: 30 INJECTION SUBCUTANEOUS at 09:55

## 2019-11-30 NOTE — ASSESSMENT & PLAN NOTE
· With suspected baseline cognitive impairment  · pending PT and OT evaluation  · Per family, the patient was seen by area of aging and was deemed that she is not safe to be home alone  They are also very concerned about her well-being living alone at home  I discussed case with Dr Diogenes Bernabe  She states patient is not safe at home and has been failing to follow-up with appointments  This has been worsening since her  passed away  She agrees with having the patient placed in an assisted living/SNF

## 2019-11-30 NOTE — ASSESSMENT & PLAN NOTE
· Venous duplex- no DVT   · Suspected this to be due to post-op edema   · Supportive care   · Elevate extremity; sling  · PT/OT

## 2019-11-30 NOTE — UTILIZATION REVIEW
11/29/19 1751  Inpatient Admission      Transfer Service: General Medicine    Expected Discharge Date: 12/02/19       Question Answer   Admitting Physician Louie Mccray [facility]   Level of Care Med Surg   Estimated length of stay More than 2 Midnights   Certification I certify that inpatient services are medically necessary for this patient for a duration of greater than two midnights  See H&P and MD Progress Notes for additional information about the patient's course of treatment  Placed in Observation status on 11/28 - changed to Inpatient on 11/30 @ 17:50 - due to continued treatment for right arm swelling, HTN control and therapy  For impaired mobility and ADL's

## 2019-11-30 NOTE — NURSING NOTE
Upon my assessment earlier within the shift Patient was complaining about two caretakers who entered in the room and "took the pad out from under me" and "bent my arm, now it's swollen"  I explained to patient that her arm was swollen from recent surgery, made sure arm was elevated and ice applied  While giving morning meds, Pt reported "someone picked up her arm and dropped it, that's why it's swollen " I reiterated the explanation of recent surgery, re-elevated Pt's arm, applied fresh ice, completed neurovascular checks  (swelling decreased) Call light within reach, will continue to monitor

## 2019-11-30 NOTE — NURSING NOTE
Pt presently in bed in no acute distress watching tv, sling on pts right arm and elevated, neurovasc checks intact to the right arm, pt denies any pain at this time, callbell in reach of the pt

## 2019-11-30 NOTE — PLAN OF CARE
Problem: Potential for Falls  Goal: Patient will remain free of falls  Description  INTERVENTIONS:  - Assess patient frequently for physical needs  -  Identify cognitive and physical deficits and behaviors that affect risk of falls    -  [place] fall precautions as indicated by assessment   - Educate patient/family on patient safety including physical limitations  - Instruct patient to call for assistance with activity based on assessment  - Modify environment to reduce risk of injury  - Consider OT/PT consult to assist with strengthening/mobility  Outcome: Progressing     Problem: PAIN - ADULT  Goal: Verbalizes/displays adequate comfort level or baseline comfort level  Description  Interventions:  - Encourage patient to monitor pain and request assistance  - Assess pain using appropriate pain scale  - Administer analgesics based on type and severity of pain and evaluate response  - Implement non-pharmacological measures as appropriate and evaluate response  - Consider cultural and social influences on pain and pain management  - Notify physician/advanced practitioner if interventions unsuccessful or patient reports new pain  Outcome: Progressing     Problem: INFECTION - ADULT  Goal: Absence or prevention of progression during hospitalization  Description  INTERVENTIONS:  - Assess and monitor for signs and symptoms of infection  - Monitor lab/diagnostic results  - Monitor all insertion sites, i e  indwelling lines, tubes, and drains  - Monitor endotracheal if appropriate and nasal secretions for changes in amount and color  - [place] appropriate cooling/warming therapies per order  - Administer medications as ordered  - Instruct and encourage patient and family to use good hand hygiene technique  - Identify and instruct in appropriate isolation precautions for identified infection/condition  Outcome: Progressing  Goal: Absence of fever/infection during neutropenic period  Description  INTERVENTIONS:  - Monitor WBC    Outcome: Progressing     Problem: SAFETY ADULT  Goal: Patient will remain free of falls  Description  INTERVENTIONS:  - Assess patient frequently for physical needs  -  Identify cognitive and physical deficits and behaviors that affect risk of falls    -  [place] fall precautions as indicated by assessment   - Educate patient/family on patient safety including physical limitations  - Instruct patient to call for assistance with activity based on assessment  - Modify environment to reduce risk of injury  - Consider OT/PT consult to assist with strengthening/mobility  Outcome: Progressing  Goal: Maintain or return to baseline ADL function  Description  INTERVENTIONS:  -  Assess patient's ability to carry out ADLs; assess patient's baseline for ADL function and identify physical deficits which impact ability to perform ADLs (bathing, care of mouth/teeth, toileting, grooming, dressing, etc )  - Assess/evaluate cause of self-care deficits   - Assess range of motion  - Assess patient's mobility; develop plan if impaired  - Assess patient's need for assistive devices and provide as appropriate  - Encourage maximum independence but intervene and supervise when necessary  - Involve family in performance of ADLs  - Assess for home care needs following discharge   - Consider OT consult to assist with ADL evaluation and planning for discharge  - Provide patient education as appropriate  Outcome: Progressing  Goal: Maintain or return mobility status to optimal level  Description  INTERVENTIONS:  - Assess patient's baseline mobility status (ambulation, transfers, stairs, etc )    - Identify cognitive and physical deficits and behaviors that affect mobility  - Identify mobility aids required to assist with transfers and/or ambulation (gait belt, sit-to-stand, lift, walker, cane, etc )  - [place] fall precautions as indicated by assessment  - Record patient progress and toleration of activity level on Mobility SBAR; progress patient to next Phase/Stage  - Instruct patient to call for assistance with activity based on assessment  - Consider rehabilitation consult to assist with strengthening/weightbearing, etc   Outcome: Progressing     Problem: DISCHARGE PLANNING  Goal: Discharge to home or other facility with appropriate resources  Description  INTERVENTIONS:  - Identify barriers to discharge w/patient and caregiver  - Arrange for needed discharge resources and transportation as appropriate  - Identify discharge learning needs (meds, wound care, etc )  - Arrange for interpretive services to assist at discharge as needed  - Refer to Case Management Department for coordinating discharge planning if the patient needs post-hospital services based on physician/advanced practitioner order or complex needs related to functional status, cognitive ability, or social support system  Outcome: Progressing     Problem: Knowledge Deficit  Goal: Patient/family/caregiver demonstrates understanding of disease process, treatment plan, medications, and discharge instructions  Description  Complete learning assessment and assess knowledge base    Interventions:  - Provide teaching at level of understanding  - Provide teaching via preferred learning methods  Outcome: Progressing     Problem: DISCHARGE PLANNING - CARE MANAGEMENT  Goal: Discharge to post-acute care or home with appropriate resources  Description  INTERVENTIONS:  - Conduct assessment to determine patient/family and health care team treatment goals, and need for post-acute services based on payer coverage, community resources, and patient preferences, and barriers to discharge  - Address psychosocial, clinical, and financial barriers to discharge as identified in assessment in conjunction with the patient/family and health care team  - Arrange appropriate level of post-acute services according to patient's   needs and preference and payer coverage in collaboration with the physician and health care team  - Communicate with and update the patient/family, physician, and health care team regarding progress on the discharge plan  - Arrange appropriate transportation to post-acute venues    Home with [address], [place] will transport,    Outcome: Progressing     Problem: Prexisting or High Potential for Compromised Skin Integrity  Goal: Skin integrity is maintained or improved  Description  INTERVENTIONS:  - Identify patients at risk for skin breakdown  - Assess and monitor skin integrity  - Assess and monitor nutrition and hydration status  - Monitor labs   - Assess for incontinence   - Turn and reposition patient  - Assist with mobility/ambulation  - Relieve pressure over bony prominences  - Avoid friction and shearing  - Provide appropriate hygiene as needed including keeping skin clean and dry  - Evaluate need for skin moisturizer/barrier cream  - Collaborate with interdisciplinary team   - Patient/family teaching  - Consider wound care consult   Outcome: Progressing

## 2019-11-30 NOTE — PROGRESS NOTES
Progress Note - Lilliana Gerber 1/1/1928, 80 y o  female MRN: [id number]    Unit/Bed#: -02 Encounter: [id number]    Primary Care Provider: Melynda Kehr, DO   Date and time admitted to hospital: 11/28/2019  7:10 PM        * Swelling of right hand  Assessment & Plan  · Venous duplex- no DVT   · Suspected this to be due to post-op edema   · Supportive care   · Elevate extremity; sling  · PT/OT       Impaired mobility and ADLs  Assessment & Plan  · With suspected baseline cognitive impairment  · pending PT and OT evaluation  · Per family, the patient was seen by area of aging and was deemed that she is not safe to be home alone  They are also very concerned about her well-being living alone at home  I discussed case with Dr Waqas Perry  She states patient is not safe at home and has been failing to follow-up with appointments  This has been worsening since her  passed away  She agrees with having the patient placed in an assisted living/SNF  Benign essential HTN  Assessment & Plan  · Patient is hypertensive on presentation and reportedly takes Cozaar at home though doses unclear  · BP is much improved   · Continue with Cozaar       Closed fracture of right distal radius and ulna  Assessment & Plan  · Will treat as per above          VTE Pharmacologic Prophylaxis: Pharmacologic: Enoxaparin (Lovenox)    Patient Centered Rounds: I have performed bedside rounds with nursing staff today  Discussions with Specialists or Other Care Team Provider: nursing, PT/OT  CM, rx  Education and Discussions with Family / Patient: patient, niece, and great nephew     Current Length of Stay: 1 day(s)    Current Patient Status: Inpatient   Certification Statement: The patient will continue to require additional inpatient hospital stay due to right arm swelling    Discharge Plan: pending placement disposition     Code Status: Level 1 - Full Code    Subjective:   Feeling well  Denies any pain  Would like to go home  Objective:     Vitals:   Temp (24hrs), Av 5 °F (36 9 °C), Min:97 9 °F (36 6 °C), Max:99 4 °F (37 4 °C)    Temp:  [97 9 °F (36 6 °C)-99 4 °F (37 4 °C)] 97 9 °F (36 6 °C)  HR:  [71-76] 71  Resp:  [17-18] 18  BP: (124-154)/(59-66) 137/65  SpO2:  [95 %-97 %] 97 %  Body mass index is 19 23 kg/m²  Input and Output Summary (last 24 hours): Intake/Output Summary (Last 24 hours) at 2019 1214  Last data filed at 2019 4795  Gross per 24 hour   Intake 480 ml   Output    Net 480 ml       Physical Exam:     Physical Exam   Constitutional: She is oriented to person, place, and time  She appears well-developed  No distress  Frail     HENT:   Head: Normocephalic and atraumatic  Mouth/Throat: Oropharynx is clear and moist    Eyes: Pupils are equal, round, and reactive to light  Conjunctivae and EOM are normal    Neck: Normal range of motion  Neck supple  No thyromegaly present  Cardiovascular: Normal rate, regular rhythm and normal heart sounds  Exam reveals no gallop and no friction rub  No murmur heard  Pulmonary/Chest: Effort normal and breath sounds normal  She has no wheezes  She has no rales  Abdominal: Soft  Bowel sounds are normal  She exhibits no distension  There is no tenderness  There is no rebound  Musculoskeletal: Normal range of motion  She exhibits edema (right wrist and arm)  She exhibits no tenderness or deformity  Neurological: She is alert and oriented to person, place, and time  No cranial nerve deficit  With baseline cognitive impairment      Skin: Skin is warm and dry  No rash noted  No erythema  Psychiatric: She has a normal mood and affect  Her behavior is normal    Vitals reviewed        Additional Data:     Labs:    Results from last 7 days   Lab Units 19  0626 19  1945   WBC Thousand/uL 7 10 9 00   HEMOGLOBIN g/dL 13 2 13 5   HEMATOCRIT % 38 1* 38 6*   PLATELETS Thousands/uL 364 386   NEUTROS PCT %  --  73   LYMPHS PCT %  --  16*   MONOS PCT % --  9   EOS PCT %  --  1     Results from last 7 days   Lab Units 11/29/19  0626 11/28/19  1945   POTASSIUM mmol/L 4 3 4 2   CHLORIDE mmol/L 106 104   CO2 mmol/L 28 29   BUN mg/dL 23 27*   CREATININE mg/dL 0 88 0 86   CALCIUM mg/dL 8 9 9 0   ALK PHOS U/L  --  46*   ALT U/L  --  10   AST U/L  --  16                   * I Have Reviewed All Lab Data Listed Above  * Additional Pertinent Lab Tests Reviewed: Saul 66 Admission  Reviewed    Imaging:  Imaging Reports Reviewed Today Include: n/a     Recent Cultures (last 7 days):           Last 24 Hours Medication List:     Current Facility-Administered Medications:  acetaminophen 650 mg Oral Q6H PRN Padmini Henson PA-C   enoxaparin 30 mg Subcutaneous Q24H [facility] HOME EMELI Hartley   losartan 50 mg Oral Daily Padmini Henson PA-C   ondansetron 4 mg Intravenous Q6H PRN Padmini Henson PA-C   oxyCODONE-acetaminophen 1 tablet Oral Q4H PRN Padmini Henson PA-C        Today, Patient Was Seen By: EMELI Hartley    ** Please Note: Dictation voice to text software may have been used in the creation of this document   **

## 2019-12-01 PROCEDURE — 97167 OT EVAL HIGH COMPLEX 60 MIN: CPT

## 2019-12-01 PROCEDURE — G8988 SELF CARE GOAL STATUS: HCPCS

## 2019-12-01 PROCEDURE — 97163 PT EVAL HIGH COMPLEX 45 MIN: CPT

## 2019-12-01 PROCEDURE — G8987 SELF CARE CURRENT STATUS: HCPCS

## 2019-12-01 PROCEDURE — G8978 MOBILITY CURRENT STATUS: HCPCS

## 2019-12-01 PROCEDURE — G8979 MOBILITY GOAL STATUS: HCPCS

## 2019-12-01 PROCEDURE — 99232 SBSQ HOSP IP/OBS MODERATE 35: CPT | Performed by: NURSE PRACTITIONER

## 2019-12-01 RX ADMIN — ENOXAPARIN SODIUM 30 MG: 30 INJECTION SUBCUTANEOUS at 08:42

## 2019-12-01 RX ADMIN — LOSARTAN POTASSIUM 50 MG: 50 TABLET, FILM COATED ORAL at 08:42

## 2019-12-01 NOTE — ASSESSMENT & PLAN NOTE
· Venous duplex- no DVT   · Suspected this to be due to post-op edema   · Supportive care   · Elevate extremity; sling  · Swelling is much improved  · PT/OT

## 2019-12-01 NOTE — PHYSICAL THERAPY NOTE
Physical Therapy Evaluation     Patient's Name: [place]    Admitting Diagnosis  Hand swelling [M79 89]  Swelling of left hand [M79 89]    Problem List  Patient Active Problem List   Diagnosis    Closed fracture of right distal radius and ulna    Osteoporosis    Swelling of right hand    Benign essential HTN    Impaired mobility and ADLs       Past Medical History  Past Medical History:   Diagnosis Date    Hypertension        Past Surgical History  Past Surgical History:   Procedure Laterality Date    CHOLECYSTECTOMY      HYSTERECTOMY      ND OPEN RX DISTAL RADIUS FX, INTRA-ARTICULAR, 3+ FRAG Right 11/26/2019    Procedure: RADIUS OPEN REDUCTION W/ INTERNAL FIXATION (ORIF); Surgeon: Annmarie Campo MD;  Location: [facility] OR;  Service: Orthopedics        12/01/19 9312   Note Type   Note type Eval only   Pain Assessment   Pain Assessment No/denies pain   Home Living   Type of [address] [place] [address] Two level; Able to live on main level with bedroom/bathroom;Stairs to enter without rails  (1 CHRIS)   Bathroom Shower/Tub Walk-in shower   Bathroom Toilet Standard   Bathroom Equipment Grab bars in shower; Shower chair;Grab bars around toilet   [address] Walker;Cane;Wheelchair-manual  (doesn't use)   Prior Function   Level of [place] Independent with ADLs and functional mobility   Lives With [employer] Help From Family   ADL Assistance Needs assistance  (currently due to UE fx however previously Indep)   IADLs Independent   Falls in the last 6 months 1 to 4   Vocational Retired   Restrictions/Precautions   [place] Bearing Precautions Per Order Yes   RUE Weight Bearing Per Order [employer]   Other Precautions Fall Risk   General   Family/Caregiver Present No   Cognition   Overall Cognitive Status WFL   Arousal/Participation Alert   Orientation Level Oriented to person;Disoriented to time;Oriented to place; Disoriented to situation  (21 [phone])   Memory Decreased long term memory;Decreased recall of recent events;Decreased short term memory   Following Commands Follows one step commands without difficulty   RLE Assessment   RLE Assessment X   Strength RLE   RLE Overall Strength 4/5   LLE Assessment   LLE Assessment X   Strength LLE   LLE Overall Strength 4/5   Coordination   Sensation WFL   Bed Mobility   Supine to Sit 4  Minimal assistance   Additional items Assist x 1;LE management;Verbal cues; Increased time required   Transfers   Sit to Stand 5  Supervision   Additional items Verbal cues; Increased time required; Impulsive   Stand to Sit 5  Supervision   Additional items Verbal cues; Increased time required;Armrests   Ambulation/Elevation   Gait pattern Short stride; Improper Weight shift   Gait Assistance 5  Supervision  (CGA)   Additional items Verbal cues; Assist x 1   Assistive Device   (HHA)   Distance 150 ft   Balance   Static Sitting Good   Dynamic Sitting Fair +   Static Standing Fair   Dynamic Standing Fair   Endurance Deficit   Endurance Deficit No   Activity Tolerance   Activity Tolerance Patient tolerated treatment well   Assessment   Prognosis Fair   Problem List Decreased strength; Impaired balance;Decreased mobility; Decreased safety awareness;Orthopedic restrictions   Assessment Pt is 80 y o  female seen for PT evaluation s/p admit to [address] [place] [address] on 11/28/2019 w/ Swelling of right hand  PT consulted to assess pt's functional mobility and d/c needs  Order placed for PT eval and tx, w/ NWB R LE order  Comorbidities affecting pt's physical performance at time of assessment include: R distal radais and ulna fracture s/p ORIF and htn  PTA, pt was ambulates household distances, has 1 CHRIS, lives alone in 2 level house and retired  Personal factors affecting pt at time of IE include: lives in 2 story house, ambulating w/ assistive device, stairs to enter home, decreased cognition, positive fall history and inability to live alone   Please find objective findings from PT assessment regarding body systems outlined above with impairments and limitations including weakness, impaired balance, gait deviations, decreased safety awareness, fall risk and orthopedic restrictions  The following objective measures performed on IE also reveal limitations: Barthel Index: 50/100  Pt's clinical presentation is currently unstable/unpredictable seen in pt's presentation of R hand swelling and confusion  Pt to benefit from continued PT tx to address deficits as defined above and maximize level of functional independent mobility and consistency  From PT/mobility standpoint, recommendation at time of d/c would be STR pending progress in order to facilitate return to PLOF  Barriers to Discharge Inaccessible home environment;Decreased caregiver support   Goals   Patient Goals to get home to see her dog Ciaran   LTG Expiration Date 12/08/19   Long Term Goal #1 Pt will: Perform transfers to modified I to improve ease of transfers  Perform ambulation with MI and cane for 250 ft to     increase Indep in home environment  Increase dynamic standing balance to F+ to decrease fall risk  Increase BLE strength to 4+/5 to improve functional mobility  Increase OOB activity tolerance to 10 minutes without s/s of exertion to decrease fall risk  Navigate up and down 1 step so patient can enter and exit home  PT Treatment Day 0   Plan   Treatment/Interventions Functional transfer training;LE strengthening/ROM; Elevations; Therapeutic exercise;Gait training   PT Frequency   (3-5x/wk)   Recommendation   Recommendation Short-term skilled PT   Equipment Recommended Cane   PT - OK to Discharge No  (to STR)   Barthel Index   Feeding 5   Bathing 0   Grooming Score 0   Dressing Score 0   Bladder Score 10   Bowels Score 10   Toilet Use Score 5   Transfers (Bed/Chair) Score 10   Mobility (Level Surface) Score 10   Stairs Score 0   Barthel Index Score 50           Mirna Newton, PT

## 2019-12-01 NOTE — PLAN OF CARE
Problem: PHYSICAL THERAPY ADULT  Goal: Performs mobility at highest level of function for planned discharge setting  See evaluation for individualized goals  Description  Treatment/Interventions: Functional transfer training, LE strengthening/ROM, Elevations, Therapeutic exercise, Gait training  Equipment Recommended: Cane       See flowsheet documentation for full assessment, interventions and recommendations  Outcome: Progressing  Note:   Prognosis: Fair  Problem List: Decreased strength, Impaired balance, Decreased mobility, Decreased safety awareness, Orthopedic restrictions  Assessment: Pt is 80 y o  female seen for PT evaluation s/p admit to [address] [place] [address] on 11/28/2019 w/ Swelling of right hand  PT consulted to assess pt's functional mobility and d/c needs  Order placed for PT eval and tx, w/ NWB R LE order  Comorbidities affecting pt's physical performance at time of assessment include: R distal radais and ulna fracture s/p ORIF and htn  PTA, pt was ambulates household distances, has 1 CHRIS, lives alone in 2 level house and retired  Personal factors affecting pt at time of IE include: lives in 2 story house, ambulating w/ assistive device, stairs to enter home, decreased cognition, positive fall history and inability to live alone  Please find objective findings from PT assessment regarding body systems outlined above with impairments and limitations including weakness, impaired balance, gait deviations, decreased safety awareness, fall risk and orthopedic restrictions  The following objective measures performed on IE also reveal limitations: Barthel Index: 50/100  Pt's clinical presentation is currently unstable/unpredictable seen in pt's presentation of R hand swelling and confusion  Pt to benefit from continued PT tx to address deficits as defined above and maximize level of functional independent mobility and consistency   From PT/mobility standpoint, recommendation at time of d/c would be STR pending progress in order to facilitate return to PLOF  Barriers to Discharge: Inaccessible home environment, Decreased caregiver support     Recommendation: Short-term skilled PT     PT - OK to Discharge: No    See flowsheet documentation for full assessment     Shannan Goddard PT

## 2019-12-01 NOTE — PLAN OF CARE
Problem: OCCUPATIONAL THERAPY ADULT  Goal: Performs self-care activities at highest level of function for planned discharge setting  See evaluation for individualized goals  Description  Treatment Interventions: ADL retraining, Functional transfer training, UE strengthening/ROM, Endurance training, Cognitive reorientation, Equipment evaluation/education, Patient/family training, Compensatory technique education, Activityengagement          See flowsheet documentation for full assessment, interventions and recommendations  Note:   Limitation: Decreased ADL status, Decreased UE strength, Decreased Safe judgement during ADL, Decreased cognition, Decreased endurance, Decreased self-care trans, Decreased high-level ADLs  Prognosis: Fair  Assessment: Pt is a 80 y o  female who was admitted to [address] [place] [address] on 11/28/2019 with Swelling of right hand  At this time, patient is also affected by the comorbidities of: closed fx of R distal radius and ulna, HTN, and impaired mobility/ADLs  Additionally, patient is affected by the following personal factors:difficulty performing ADLS and difficulty performing IADLS   Orders placed for OT evaluation/treatment with up with assistance orders  Prior to admission, Patient reporting requiring assistance with ADLs/IADLs since RUE fracture and living [facility] in a two story house with 1 CHRIS  Upon OT evaluation, patient requires minimum assist for UB ADLs and moderate assist for LB ADLs  Occupational performance is affected by the following deficits: decreased ROM, decreased strength, decreased balance, decreased tolerance, impaired memory, impaired sequencing, impaired problem solving and orthopedic restrictions  Based on the following information, patient would benefit from continued skilled OT treatment while in the hospital to address deficits and maximize level of functional independence with ADL's and functional mobility   Occupational Performance areas to address include: eating, grooming, bathing/shower, toilet hygiene, dressing, medication management, functional mobility, clothing management, meal prep and household maintenance  From OT standpoint, recommendation at time of d/c would be STR       OT Discharge Recommendation: Short Term Rehab  OT - OK to Discharge: Yes(Once medically cleared)    Yaya Cervantes OT

## 2019-12-01 NOTE — PROGRESS NOTES
Progress Note - Yana Vazquez 1/1/1928, 80 y o  female MRN: [id number]    Unit/Bed#: -02 Encounter: [id number]    Primary Care Provider: Arnaldo Chacon DO   Date and time admitted to hospital: 11/28/2019  7:10 PM        * Swelling of right hand  Assessment & Plan  · Venous duplex- no DVT   · Suspected this to be due to post-op edema   · Supportive care   · Elevate extremity; sling  · Swelling is much improved  · PT/OT         Impaired mobility and ADLs  Assessment & Plan  · Likely due to undiagnosed dementia and baseline cognitive impairment   · PT and OT input appreciated   · Per family, the patient was seen by area of aging and was deemed that she is not safe to be home alone  They are also very concerned about her well-being living alone at home  I discussed case with Dr Clarence Guerrero  She states patient is not safe at home and has been failing to follow-up with appointments  This has been worsening since her  passed away  She agrees with having the patient placed in an assisted living/SNF  Benign essential HTN  Assessment & Plan  · Patient is hypertensive on presentation and reportedly takes Cozaar at home though doses unclear  · BP is much improved   · Continue with Cozaar       Closed fracture of right distal radius and ulna  Assessment & Plan  · Will treat as per above          VTE Pharmacologic Prophylaxis: Pharmacologic: Enoxaparin (Lovenox)    Patient Centered Rounds: I have performed bedside rounds with nursing staff today  Discussions with Specialists or Other Care Team Provider: nursing, PT/OT, Rx  Education and Discussions with Family / Patient: patient and family     Current Length of Stay: 2 day(s)    Current Patient Status: Inpatient   Certification Statement: The patient will continue to require additional inpatient hospital stay due to right hand swelling and inability to perform ADLs  will need placement       Discharge Plan: pending discharge disposition     Code Status: Level 1 - Full Code    Subjective:   Feeling well  She would like to go home  Objective:     Vitals:   Temp (24hrs), Av 6 °F (37 °C), Min:98 2 °F (36 8 °C), Max:99 3 °F (37 4 °C)    Temp:  [98 2 °F (36 8 °C)-99 3 °F (37 4 °C)] 99 3 °F (37 4 °C)  HR:  [70-77] 71  Resp:  [18] 18  BP: (120-147)/(60-67) 120/60  SpO2:  [95 %-98 %] 98 %  Body mass index is 18 87 kg/m²  Input and Output Summary (last 24 hours): Intake/Output Summary (Last 24 hours) at 2019 1517  Last data filed at 2019 2100  Gross per 24 hour   Intake 240 ml   Output    Net 240 ml       Physical Exam:     Physical Exam   Constitutional: She appears well-developed  No distress  Frail      HENT:   Head: Normocephalic and atraumatic  Mouth/Throat: Oropharynx is clear and moist    Eyes: Pupils are equal, round, and reactive to light  Conjunctivae and EOM are normal    Neck: Normal range of motion  Neck supple  No thyromegaly present  Cardiovascular: Normal rate, regular rhythm and normal heart sounds  Exam reveals no gallop and no friction rub  No murmur heard  Pulmonary/Chest: Effort normal and breath sounds normal  She has no wheezes  She has no rales  Abdominal: Soft  Bowel sounds are normal  She exhibits no distension  There is no tenderness  There is no rebound  Musculoskeletal: Normal range of motion  She exhibits no edema, tenderness or deformity  Neurological: She is alert  No cranial nerve deficit  With baseline cognitive impairment and likely undiagnosed dementia      Skin: Skin is warm and dry  No rash noted  No erythema  Psychiatric: She has a normal mood and affect  Her behavior is normal  Judgment and thought content normal    Vitals reviewed        Additional Data:     Labs:    Results from last 7 days   Lab Units 19  0626 19  1945   WBC Thousand/uL 7 10 9 00   HEMOGLOBIN g/dL 13 2 13 5   HEMATOCRIT % 38 1* 38 6*   PLATELETS Thousands/uL 364 386   NEUTROS PCT %  --  73   LYMPHS PCT % --  16*   MONOS PCT %  --  9   EOS PCT %  --  1     Results from last 7 days   Lab Units 11/29/19  0626 11/28/19  1945   POTASSIUM mmol/L 4 3 4 2   CHLORIDE mmol/L 106 104   CO2 mmol/L 28 29   BUN mg/dL 23 27*   CREATININE mg/dL 0 88 0 86   CALCIUM mg/dL 8 9 9 0   ALK PHOS U/L  --  46*   ALT U/L  --  10   AST U/L  --  16                   * I Have Reviewed All Lab Data Listed Above  * Additional Pertinent Lab Tests Reviewed: Saul [facility] Admission  Reviewed    Imaging:  Imaging Reports Reviewed Today Include: n/a     Recent Cultures (last 7 days):           Last 24 Hours Medication List:     Current Facility-Administered Medications:  acetaminophen 650 mg Oral Q6H PRN Raquel Saldana PA-C   enoxaparin 30 mg Subcutaneous Q24H [facility] EMELI Low   losartan 50 mg Oral Daily Raquel Saldana PA-C   ondansetron 4 mg Intravenous Q6H PRN Raquel Saldana PA-C   oxyCODONE-acetaminophen 1 tablet Oral Q4H PRN Raquel Saldana PA-C        Today, Patient Was Seen By: EMELI Low    ** Please Note: Dictation voice to text software may have been used in the creation of this document   **

## 2019-12-01 NOTE — ASSESSMENT & PLAN NOTE
· Likely due to undiagnosed dementia and baseline cognitive impairment   · PT and OT input appreciated   · Per family, the patient was seen by area of aging and was deemed that she is not safe to be home alone  They are also very concerned about her well-being living alone at home  I discussed case with Dr Jina Gonsalves  She states patient is not safe at home and has been failing to follow-up with appointments  This has been worsening since her  passed away  She agrees with having the patient placed in an assisted living/SNF

## 2019-12-01 NOTE — PLAN OF CARE
Problem: Potential for Falls  Goal: Patient will remain free of falls  Description  INTERVENTIONS:  - Assess patient frequently for physical needs  -  Identify cognitive and physical deficits and behaviors that affect risk of falls    -  [place] fall precautions as indicated by assessment   - Educate patient/family on patient safety including physical limitations  - Instruct patient to call for assistance with activity based on assessment  - Modify environment to reduce risk of injury  - Consider OT/PT consult to assist with strengthening/mobility  Outcome: Progressing     Problem: PAIN - ADULT  Goal: Verbalizes/displays adequate comfort level or baseline comfort level  Description  Interventions:  - Encourage patient to monitor pain and request assistance  - Assess pain using appropriate pain scale  - Administer analgesics based on type and severity of pain and evaluate response  - Implement non-pharmacological measures as appropriate and evaluate response  - Consider cultural and social influences on pain and pain management  - Notify physician/advanced practitioner if interventions unsuccessful or patient reports new pain  Outcome: Progressing     Problem: INFECTION - ADULT  Goal: Absence or prevention of progression during hospitalization  Description  INTERVENTIONS:  - Assess and monitor for signs and symptoms of infection  - Monitor lab/diagnostic results  - Monitor all insertion sites, i e  indwelling lines, tubes, and drains  - Monitor endotracheal if appropriate and nasal secretions for changes in amount and color  - [place] appropriate cooling/warming therapies per order  - Administer medications as ordered  - Instruct and encourage patient and family to use good hand hygiene technique  - Identify and instruct in appropriate isolation precautions for identified infection/condition  Outcome: Progressing  Goal: Absence of fever/infection during neutropenic period  Description  INTERVENTIONS:  - Monitor WBC    Outcome: Progressing     Problem: SAFETY ADULT  Goal: Patient will remain free of falls  Description  INTERVENTIONS:  - Assess patient frequently for physical needs  -  Identify cognitive and physical deficits and behaviors that affect risk of falls    -  [place] fall precautions as indicated by assessment   - Educate patient/family on patient safety including physical limitations  - Instruct patient to call for assistance with activity based on assessment  - Modify environment to reduce risk of injury  - Consider OT/PT consult to assist with strengthening/mobility  Outcome: Progressing  Goal: Maintain or return to baseline ADL function  Description  INTERVENTIONS:  -  Assess patient's ability to carry out ADLs; assess patient's baseline for ADL function and identify physical deficits which impact ability to perform ADLs (bathing, care of mouth/teeth, toileting, grooming, dressing, etc )  - Assess/evaluate cause of self-care deficits   - Assess range of motion  - Assess patient's mobility; develop plan if impaired  - Assess patient's need for assistive devices and provide as appropriate  - Encourage maximum independence but intervene and supervise when necessary  - Involve family in performance of ADLs  - Assess for home care needs following discharge   - Consider OT consult to assist with ADL evaluation and planning for discharge  - Provide patient education as appropriate  Outcome: Progressing  Goal: Maintain or return mobility status to optimal level  Description  INTERVENTIONS:  - Assess patient's baseline mobility status (ambulation, transfers, stairs, etc )    - Identify cognitive and physical deficits and behaviors that affect mobility  - Identify mobility aids required to assist with transfers and/or ambulation (gait belt, sit-to-stand, lift, walker, cane, etc )  - [place] fall precautions as indicated by assessment  - Record patient progress and toleration of activity level on Mobility SBAR; progress patient to next Phase/Stage  - Instruct patient to call for assistance with activity based on assessment  - Consider rehabilitation consult to assist with strengthening/weightbearing, etc   Outcome: Progressing     Problem: DISCHARGE PLANNING  Goal: Discharge to home or other facility with appropriate resources  Description  INTERVENTIONS:  - Identify barriers to discharge w/patient and caregiver  - Arrange for needed discharge resources and transportation as appropriate  - Identify discharge learning needs (meds, wound care, etc )  - Arrange for interpretive services to assist at discharge as needed  - Refer to Case Management Department for coordinating discharge planning if the patient needs post-hospital services based on physician/advanced practitioner order or complex needs related to functional status, cognitive ability, or social support system  Outcome: Progressing     Problem: Knowledge Deficit  Goal: Patient/family/caregiver demonstrates understanding of disease process, treatment plan, medications, and discharge instructions  Description  Complete learning assessment and assess knowledge base    Interventions:  - Provide teaching at level of understanding  - Provide teaching via preferred learning methods  Outcome: Progressing     Problem: DISCHARGE PLANNING - CARE MANAGEMENT  Goal: Discharge to post-acute care or home with appropriate resources  Description  INTERVENTIONS:  - Conduct assessment to determine patient/family and health care team treatment goals, and need for post-acute services based on payer coverage, community resources, and patient preferences, and barriers to discharge  - Address psychosocial, clinical, and financial barriers to discharge as identified in assessment in conjunction with the patient/family and health care team  - Arrange appropriate level of post-acute services according to patient's   needs and preference and payer coverage in collaboration with the physician and health care team  - Communicate with and update the patient/family, physician, and health care team regarding progress on the discharge plan  - Arrange appropriate transportation to post-acute venues    Home with [address], [place] will transport,    Outcome: Progressing     Problem: Prexisting or High Potential for Compromised Skin Integrity  Goal: Skin integrity is maintained or improved  Description  INTERVENTIONS:  - Identify patients at risk for skin breakdown  - Assess and monitor skin integrity  - Assess and monitor nutrition and hydration status  - Monitor labs   - Assess for incontinence   - Turn and reposition patient  - Assist with mobility/ambulation  - Relieve pressure over bony prominences  - Avoid friction and shearing  - Provide appropriate hygiene as needed including keeping skin clean and dry  - Evaluate need for skin moisturizer/barrier cream  - Collaborate with interdisciplinary team   - Patient/family teaching  - Consider wound care consult   Outcome: Progressing

## 2019-12-01 NOTE — OCCUPATIONAL THERAPY NOTE
Occupational Therapy Evaluation      Vikas Begumoleon    12/1/2019    Patient Active Problem List   Diagnosis    Closed fracture of right distal radius and ulna    Osteoporosis    Swelling of right hand    Benign essential HTN    Impaired mobility and ADLs       Past Medical History:   Diagnosis Date    Hypertension        Past Surgical History:   Procedure Laterality Date    CHOLECYSTECTOMY      HYSTERECTOMY      [place] OPEN RX DISTAL RADIUS FX, INTRA-ARTICULAR, 3+ FRAG Right 11/26/2019    Procedure: RADIUS OPEN REDUCTION W/ INTERNAL FIXATION (ORIF); Surgeon: Christa Vásquez MD;  Location: [facility];  Service: Orthopedics        12/01/19 8394   Note Type   Note type Eval only   Restrictions/Precautions   Weight Bearing Precautions Per Order Yes   RUE Weight Bearing Per Order NWB   Braces or Orthoses Sling   Other Precautions Fall Risk;Bed Alarm; Chair Alarm   Pain Assessment   Pain Assessment No/denies pain   Pain Score No Pain   Home Living   Type of Home House   Home Layout Two level; Able to live on main level with bedroom/bathroom; Performs ADLs on one level;Stairs to enter with rails  (1 CHRIS)   Bathroom Shower/Tub Walk-in shower   Bathroom Toilet Standard   Bathroom Equipment Grab bars in shower; Shower chair;Grab bars around toilet   [facility] Walker;Cane;Wheelchair-manual  (no AD used at baseline )   Prior Function   Level of [place] Independent with ADLs and functional mobility   Lives With Family  (sukhwinder is staying with pt until pt is back to baseline)   Receives Help From Family   ADL Assistance Needs assistance  (currently due to UE fx however previously Indep)   IADLs Needs assistance   Falls in the last 6 months 1 to 4   Vocational Retired   Lifestyle   Autonomy Patient reporting requiring assistance with ADLs/IADLs since RUE fracture and living wit sukhwinder in a two story house with 1 CHRIS   Reciprocal Relationships neice    ADL   Eating Assistance 5 Supervision/Setup   Grooming Assistance 5  Supervision/Setup   UB Bathing Assistance 4  Minimal Assistance   LB Bathing Assistance 3  Moderate Assistance   UB Dressing Assistance 4  Minimal Assistance   LB Dressing Assistance 3  Moderate Assistance   Toileting Assistance  3  Moderate Assistance   Bed Mobility   Supine to Sit 4  Minimal assistance   Additional items Assist x 1;HOB elevated; Increased time required;Verbal cues   Additional Comments Pt OOB and seated in chair at end of eval with chair alarm on and call bell within reach    Transfers   Sit to Stand 5  Supervision   Additional items Assist x 1;Verbal cues   Stand to Sit 5  Supervision   Additional items Assist x 1;Verbal cues;Armrests   Functional Mobility   Functional Mobility 5  Supervision   Additional items Hand hold assistance   Balance   Static Sitting Good   Dynamic Sitting Fair +   Static Standing Fair   Dynamic Standing Fair   Activity Tolerance   Activity Tolerance Patient tolerated treatment well   Nurse Made Aware RN made aware of outcomes    RUE Assessment   RUE Assessment X  (sling rejusted while sitting EOB, pt demonstrated full fist )   Edema   RUE Edema Trace   LUE Assessment   LUE Assessment WFL  (full AROM, grossly 4-/5 MMT)   Hand Function   Gross Motor Coordination Functional   Fine Motor Coordination Functional   Cognition   Overall Cognitive Status WFL   Arousal/Participation Alert; Responsive; Cooperative   Attention Within functional limits   Orientation Level Oriented to person;Disoriented to time;Oriented to place; Disoriented to situation  (21 [phone])   Memory Decreased long term memory;Decreased recall of recent events;Decreased short term memory   Following Commands Follows one step commands without difficulty   Comments Pt agreeable to OT eval    Assessment   Limitation Decreased ADL status; Decreased UE strength;Decreased Safe judgement during ADL;Decreased cognition;Decreased endurance;Decreased self-care trans;Decreased high-level ADLs   Prognosis Fair   Assessment Pt is a 80 y o  female who was admitted to [address] [place] [address] on 11/28/2019 with Swelling of right hand  At this time, patient is also affected by the comorbidities of: closed fx of R distal radius and ulna, HTN, and impaired mobility/ADLs  Additionally, patient is affected by the following personal factors:difficulty performing ADLS and difficulty performing IADLS   Orders placed for OT evaluation/treatment with up with assistance orders  Prior to admission, Patient reporting requiring assistance with ADLs/IADLs since RUE fracture and living [facility] in a two story house with 1 CHRIS  Upon OT evaluation, patient requires minimum assist for UB ADLs and moderate assist for LB ADLs  Occupational performance is affected by the following deficits: decreased ROM, decreased strength, decreased balance, decreased tolerance, impaired memory, impaired sequencing, impaired problem solving and orthopedic restrictions  Based on the following information, patient would benefit from continued skilled OT treatment while in the hospital to address deficits and maximize level of functional independence with ADL's and functional mobility  Occupational Performance areas to address include: eating, grooming, bathing/shower, toilet hygiene, dressing, medication management, functional mobility, clothing management, meal prep and household maintenance  From OT standpoint, recommendation at time of d/c would be STR  Goals   Patient Goals to go home today to take care of dog    Plan   Treatment Interventions ADL retraining;Functional transfer training;UE strengthening/ROM; Endurance training;Cognitive reorientation;Equipment evaluation/education;Patient/family training; Compensatory technique education; Activityengagement   Goal Expiration Date 12/11/19   OT Treatment Day 0   OT Frequency 3-5x/wk   Recommendation   OT Discharge Recommendation Short Term Rehab   OT - OK to Discharge Yes  (Once medically cleared)   Barthel Index   Feeding 5   Bathing 0   Grooming Score 0   Dressing Score 5   Bladder Score 5   Bowels Score 5   Toilet Use Score 5   Transfers (Bed/Chair) Score 10   Mobility (Level Surface) Score 0   Stairs Score 0   Barthel Index Score 35     GOALS:    *ADL transfers with (I) for inc'd independence with ADLs/purposeful tasks    *UB ADL with (I) for inc'd independence with self cares    *LB ADL with (S) using AE prn for inc'd independence with self cares    *Toileting with (S) for clothing management and hygiene for return to PLOF with personal care    *Increase static stand balance to good and dyn stand balance to fair+ for inc'd safety with standing purposeful tasks    *Increase stand tolerance x10 m for inc'd tolerance with standing purposeful tasks    *Participate in 10m UE therex to increase overall stamina/activity tolerance for purposeful tasks    *Bed mobility- (I) for inc'd independence to manage own comfort and initiate EOB & OOB purposeful tasks    *Patient will verbalize 3 safety awareness/ principles to prevent falls in the home setting  *Patient will increase OOB/sitting tolerance to 2-4 hours per day to increase participation in self-care and leisure tasks with no s/s of exertion           Margarito Cordero MS, OTR/L

## 2019-12-02 PROCEDURE — 97116 GAIT TRAINING THERAPY: CPT

## 2019-12-02 PROCEDURE — 99232 SBSQ HOSP IP/OBS MODERATE 35: CPT | Performed by: NURSE PRACTITIONER

## 2019-12-02 PROCEDURE — 97530 THERAPEUTIC ACTIVITIES: CPT

## 2019-12-02 RX ADMIN — ENOXAPARIN SODIUM 30 MG: 30 INJECTION SUBCUTANEOUS at 09:58

## 2019-12-02 RX ADMIN — LOSARTAN POTASSIUM 50 MG: 50 TABLET, FILM COATED ORAL at 09:58

## 2019-12-02 NOTE — PHYSICAL THERAPY NOTE
12/02/19 0833   Pain Assessment   Pain Assessment No/denies pain   Restrictions/Precautions   RUE Weight Bearing Per Order NWB   Braces or Orthoses Sling   Other Precautions Fall Risk   General   Chart Reviewed Yes   Response to Previous Treatment Patient with no complaints from previous session  Family/Caregiver Present No   Cognition   Overall Cognitive Status WFL   Arousal/Participation Alert; Responsive; Cooperative   Attention Within functional limits   Following Commands Follows one step commands without difficulty   Comments pt agreeable to PT session   Bed Mobility   Rolling R 5  Supervision   Additional items HOB elevated; Bedrails; Increased time required;Verbal cues   Supine to Sit 4  Minimal assistance   Additional items Assist x 1; Increased time required;Verbal cues   Additional Comments pt oob in chair at end of session   Transfers   Sit to Stand 5  Supervision   Additional items Verbal cues; Increased time required   Stand to Sit 5  Supervision   Additional items Verbal cues; Increased time required;Armrests   Toilet transfer 5  Supervision   Additional items Increased time required;Verbal cues;Standard toilet   Ambulation/Elevation   Gait pattern Improper Weight shift; Short stride   Gait Assistance 5  Supervision  (CGA)   Distance 15 ftx2 to bathroom   Balance   Static Sitting Good   Dynamic Sitting Fair +   Static Standing Fair   Dynamic Standing Fair   Ambulatory Fair   Endurance Deficit   Endurance Deficit No   Assessment   Prognosis Good   Problem List Decreased strength; Impaired balance;Decreased mobility; Decreased safety awareness;Orthopedic restrictions   Assessment Pt seen for PT treatment session this date with interventions consisting of gait training w/ emphasis on improving pt's ability to ambulate level surfaces x 15ftx2 to bathroom and back with CGA provided by therapist with RW and therapeutic activity consisting of training: bed mobility, supine<>sit transfers, sit<>stand transfers and toilet transfers  Pt agreeable to PT treatment session upon arrival, pt found supine in bed w/ HOB elevated, in no apparent distress, A&O x 4 and responsive  In comparison to previous session, pt with improvements in tolerance to PT session  Post session: pt returned back to recliner, chair alarm engaged and all needs in reach Continue to recommend STR vs  Home PT at time of d/c in order to maximize pt's functional independence and safety w/ mobility  Pt continues to be functioning below baseline level, and remains limited 2* factors listed above and including decreased functional mobility and weight bearing restriction on RUE  PT will continue to see pt while here in order to address the deficits listed above and provide interventions consistent w/ POC in effort to achieve STGs  Barriers to Discharge Inaccessible home environment;Decreased caregiver support   Goals   Patient Goals to go home   PT Treatment Day 2   Plan   Treatment/Interventions Functional transfer training;LE strengthening/ROM; Therapeutic exercise; Endurance training;Bed mobility;Gait training   Progress Progressing toward goals   PT Frequency   (3-5x/wk)   Recommendation   Recommendation Short-term skilled PT  (vs HHC)   PT - OK to Discharge Yes  (when medically stable)   Sandi Slois Chol, PTA

## 2019-12-02 NOTE — SOCIAL WORK
Visit with patient in her hospital room after [facility]  Patient unsafe to return home  Attempt made to contact niece x 3 without result  Contacted niece Nathalie Culverkayla at this time  She does not feel Burnguy Ellis should return home  Made aware referrals made to [facility] and is agreeable to same  Niece still looking into Assisted Living

## 2019-12-02 NOTE — PLAN OF CARE
Problem: PHYSICAL THERAPY ADULT  Goal: Performs mobility at highest level of function for planned discharge setting  See evaluation for individualized goals  Description  Treatment/Interventions: Functional transfer training, LE strengthening/ROM, Elevations, Therapeutic exercise, Gait training  Equipment Recommended: Cane       See flowsheet documentation for full assessment, interventions and recommendations  Outcome: Progressing  Note:   Prognosis: Good  Problem List: Decreased strength, Impaired balance, Decreased mobility, Decreased safety awareness, Orthopedic restrictions  Assessment: Pt seen for PT treatment session this date with interventions consisting of gait training w/ emphasis on improving pt's ability to ambulate level surfaces x 15ftx2 to bathroom and back with CGA provided by therapist with RW and therapeutic activity consisting of training: bed mobility, supine<>sit transfers, sit<>stand transfers and toilet transfers  Pt agreeable to PT treatment session upon arrival, pt found supine in bed w/ HOB elevated, in no apparent distress, A&O x 4 and responsive  In comparison to previous session, pt with improvements in tolerance to PT session  Post session: pt returned back to recliner, chair alarm engaged and all needs in reach Continue to recommend STR vs  Home PT at time of d/c in order to maximize pt's functional independence and safety w/ mobility  Pt continues to be functioning below baseline level, and remains limited 2* factors listed above and including decreased functional mobility and weight bearing restriction on RUE  PT will continue to see pt while here in order to address the deficits listed above and provide interventions consistent w/ POC in effort to achieve STGs    Barriers to Discharge: Inaccessible home environment, Decreased caregiver support     Recommendation: Short-term skilled PT(vs HHC)     PT - OK to Discharge: Yes(when medically stable)    See flowsheet documentation for full assessment

## 2019-12-02 NOTE — PLAN OF CARE
Problem: Potential for Falls  Goal: Patient will remain free of falls  Description  INTERVENTIONS:  - Assess patient frequently for physical needs  -  Identify cognitive and physical deficits and behaviors that affect risk of falls    -  [place] fall precautions as indicated by assessment   - Educate patient/family on patient safety including physical limitations  - Instruct patient to call for assistance with activity based on assessment  - Modify environment to reduce risk of injury  - Consider OT/PT consult to assist with strengthening/mobility  Outcome: Progressing     Problem: INFECTION - ADULT  Goal: Absence or prevention of progression during hospitalization  Description  INTERVENTIONS:  - Assess and monitor for signs and symptoms of infection  - Monitor lab/diagnostic results  - Monitor all insertion sites, i e  indwelling lines, tubes, and drains  - Monitor endotracheal if appropriate and nasal secretions for changes in amount and color  - [place] appropriate cooling/warming therapies per order  - Administer medications as ordered  - Instruct and encourage patient and family to use good hand hygiene technique  - Identify and instruct in appropriate isolation precautions for identified infection/condition  Outcome: Progressing  Goal: Absence of fever/infection during neutropenic period  Description  INTERVENTIONS:  - Monitor WBC    Outcome: Progressing

## 2019-12-02 NOTE — ASSESSMENT & PLAN NOTE
· Likely due to undiagnosed dementia and baseline cognitive impairment   · PT and OT input appreciated   · Per family, the patient was seen by area of aging and was deemed that she is not safe to be home alone  They are also very concerned about her well-being living alone at home  I discussed case with Dr Waqas Perry  She states patient is not safe at home and has been failing to follow-up with appointments  This has been worsening since her  passed away  She agrees with having the patient placed in an assisted living/SNF    · Discussed with patient today and she is agreeable to go to a short-term rehab

## 2019-12-02 NOTE — PROGRESS NOTES
Progress Note - Marie Brewer 1/1/1928, 80 y o  female MRN: [id number]    Unit/Bed#: -02 Encounter: [id number]    Primary Care Provider: Carlo Armas DO   Date and time admitted to hospital: 11/28/2019  7:10 PM        * Swelling of right hand  Assessment & Plan  · Venous duplex- no DVT   · Suspected this to be due to post-op edema   · Supportive care   · Elevate extremity; sling  · Swelling is much improved  · PT/OT           Impaired mobility and ADLs  Assessment & Plan  · Likely due to undiagnosed dementia and baseline cognitive impairment   · PT and OT input appreciated   · Per family, the patient was seen by area of aging and was deemed that she is not safe to be home alone  They are also very concerned about her well-being living alone at home  I discussed case with Dr Eren Miller  She states patient is not safe at home and has been failing to follow-up with appointments  This has been worsening since her  passed away  She agrees with having the patient placed in an assisted living/SNF  · Discussed with patient today and she is agreeable to go to a short-term rehab    Benign essential HTN  Assessment & Plan  · Patient is hypertensive on presentation and reportedly takes Cozaar at home though doses unclear  · BP is much improved   · Continue with Cozaar         Closed fracture of right distal radius and ulna  Assessment & Plan  · Will treat as per above            VTE Pharmacologic Prophylaxis: Pharmacologic: Enoxaparin (Lovenox)    Patient Centered Rounds: I have performed bedside rounds with nursing staff today      Discussions with Specialists or Other Care Team Provider:  Nursing, case management, PT and OT, pharmacy  Education and Discussions with Family / Patient:  Patient    Current Length of Stay: 3 day(s)    Current Patient Status: Inpatient   Certification Statement: The patient will continue to require additional inpatient hospital stay due to Right arm swelling pending discharge placement    Discharge Plan:  Pending hospital course  Await for insurance authorization for short-term rehab  Hopeful in 24-48 hours    Code Status: Level 1 - Full Code    Subjective:   Feeling well  Denies any pain  Objective:     Vitals:   Temp (24hrs), Av °F (36 7 °C), Min:97 5 °F (36 4 °C), Max:98 5 °F (36 9 °C)    Temp:  [97 5 °F (36 4 °C)-98 5 °F (36 9 °C)] 98 5 °F (36 9 °C)  HR:  [60-77] 77  Resp:  [18] 18  BP: (132-159)/(60-70) 148/64  SpO2:  [93 %-98 %] 98 %  Body mass index is 18 87 kg/m²  Input and Output Summary (last 24 hours): Intake/Output Summary (Last 24 hours) at 2019 1618  Last data filed at 2019 1700  Gross per 24 hour   Intake 240 ml   Output 150 ml   Net 90 ml       Physical Exam:     Physical Exam   Constitutional: She appears well-developed  No distress  Frail      HENT:   Head: Normocephalic and atraumatic  Mouth/Throat: Oropharynx is clear and moist    Eyes: Pupils are equal, round, and reactive to light  Conjunctivae and EOM are normal    Neck: Normal range of motion  Neck supple  No thyromegaly present  Cardiovascular: Normal rate, regular rhythm and normal heart sounds  Exam reveals no gallop and no friction rub  No murmur heard  Pulmonary/Chest: Effort normal and breath sounds normal  She has no wheezes  She has no rales  Abdominal: Soft  Bowel sounds are normal  She exhibits no distension  There is no tenderness  There is no rebound  Musculoskeletal: Normal range of motion  She exhibits edema (right arm, in sling)  She exhibits no tenderness or deformity  Neurological: She is alert  No cranial nerve deficit  With baseline cognitive impairment   Skin: Skin is warm and dry  No rash noted  No erythema  Psychiatric: She has a normal mood and affect  Her behavior is normal    Vitals reviewed        Additional Data:     Labs:    Results from last 7 days   Lab Units 19  06195   WBC Thousand/uL 7 10 9 00 HEMOGLOBIN g/dL 13 2 13 5   HEMATOCRIT % 38 1* 38 6*   PLATELETS Thousands/uL 364 386   NEUTROS PCT %  --  73   LYMPHS PCT %  --  16*   MONOS PCT %  --  9   EOS PCT %  --  1     Results from last 7 days   Lab Units 11/29/19  0626 11/28/19  1945   POTASSIUM mmol/L 4 3 4 2   CHLORIDE mmol/L 106 104   CO2 mmol/L 28 29   BUN mg/dL 23 27*   CREATININE mg/dL 0 88 0 86   CALCIUM mg/dL 8 9 9 0   ALK PHOS U/L  --  46*   ALT U/L  --  10   AST U/L  --  16                   * I Have Reviewed All Lab Data Listed Above  * Additional Pertinent Lab Tests Reviewed: [address] Admission  Reviewed    Imaging:  Imaging Reports Reviewed Today Include: n/a     Recent Cultures (last 7 days):           Last 24 Hours Medication List:     Current Facility-Administered Medications:  acetaminophen 650 mg Oral Q6H PRN Bella Malik PA-C   enoxaparin 30 mg Subcutaneous Q24H [facility] HOME EMELI Abreu   losartan 50 mg Oral Daily Bella Malik PA-C   ondansetron 4 mg Intravenous Q6H PRN Bella Malik PA-C   oxyCODONE-acetaminophen 1 tablet Oral Q4H PRN Bella Malik PA-C        Today, Patient Was Seen By: EMELI Abreu    ** Please Note: Dictation voice to text software may have been used in the creation of this document   **

## 2019-12-03 PROCEDURE — 97530 THERAPEUTIC ACTIVITIES: CPT

## 2019-12-03 PROCEDURE — 97535 SELF CARE MNGMENT TRAINING: CPT

## 2019-12-03 PROCEDURE — 99231 SBSQ HOSP IP/OBS SF/LOW 25: CPT | Performed by: INTERNAL MEDICINE

## 2019-12-03 RX ORDER — HYDRALAZINE HYDROCHLORIDE 20 MG/ML
10 INJECTION INTRAMUSCULAR; INTRAVENOUS EVERY 6 HOURS PRN
Status: DISCONTINUED | OUTPATIENT
Start: 2019-12-03 | End: 2019-12-04 | Stop reason: HOSPADM

## 2019-12-03 RX ADMIN — LOSARTAN POTASSIUM 50 MG: 50 TABLET, FILM COATED ORAL at 08:31

## 2019-12-03 RX ADMIN — HYDRALAZINE HYDROCHLORIDE 10 MG: 20 INJECTION INTRAMUSCULAR; INTRAVENOUS at 22:36

## 2019-12-03 RX ADMIN — ENOXAPARIN SODIUM 30 MG: 30 INJECTION SUBCUTANEOUS at 08:32

## 2019-12-03 NOTE — PHYSICAL THERAPY NOTE
PHYSICAL THERAPY NOTE          Patient Name: Joseph Uribe  UCLBP'C Date: 12/3/2019  Attempted to see patient this afternoon but she reported that she just got back into bed from being up and out of bed early this morning  She expressed being too tired and would rather not do anything at this moment  Patient educated on the benefits of Physical Therapy but patient continues to deny treatment at this time    Leora Green, PTA

## 2019-12-03 NOTE — SOCIAL WORK
I called the [place] they are reviewing this case, I did call Maria E Sharpe (niece) she stated she is meeting with the pt's  to get all the needed information, [facility] make [facility], I did ask for additional choices, the only choice she would give is for [facility] and  Referral was sent, cm will continue to follow

## 2019-12-03 NOTE — PROGRESS NOTES
Progress Note - Ashlie Loza 1/1/1928, 80 y o  female MRN: [id number]    Unit/Bed#: -02 Encounter: [id number]    Primary Care Provider: Eva Stinson DO   Date and time admitted to hospital: 11/28/2019  7:10 PM        * Swelling of right hand  Assessment & Plan  · Venous duplex- no DVT   · Secondary to postop edema  · Supportive care   · Elevate extremity; sling  · Swelling is much improved  · PT/OT           Benign essential HTN  Assessment & Plan  · Patient is hypertensive on presentation and reportedly takes Cozaar at home though doses unclear  · BP is much improved   · Continue with Cozaar         Closed fracture of right distal radius and ulna  Assessment & Plan  · Will treat as per above        Impaired mobility and ADLs  Assessment & Plan  · Likely due to undiagnosed dementia and baseline cognitive impairment   · PT and OT input appreciated   · Per family, the patient was seen by area of aging and was deemed that she is not safe to be home alone  They are also very concerned about her well-being living alone at home  I discussed case with Dr Diogenes Bernabe  She states patient is not safe at home and has been failing to follow-up with appointments  This has been worsening since her  passed away  She agrees with having the patient placed in an assisted living/SNF  · Discussed with patient today and she is agreeable to go to a short-term rehab    Awaiting placement        VTE Pharmacologic Prophylaxis:   Pharmacologic: Enoxaparin (Lovenox)  Mechanical VTE Prophylaxis in Place: Yes    Patient Centered Rounds: I have performed bedside rounds with nursing staff today    Current Length of Stay: 4 day(s)    Current Patient Status: Inpatient   Certification Statement: The patient will continue to require additional inpatient hospital stay due to Awaiting rehab placement    Discharge Plan / Estimated Discharge Date:  12/4/2019      Code Status: Level 1 - Full Code      Subjective:   No pain    Objective:     Vitals:   Temp (24hrs), Av 5 °F (36 9 °C), Min:97 8 °F (36 6 °C), Max:99 2 °F (37 3 °C)    Temp:  [97 8 °F (36 6 °C)-99 2 °F (37 3 °C)] 99 2 °F (37 3 °C)  HR:  [65-74] 74  Resp:  [18] 18  BP: (138-162)/(61-71) 157/71  SpO2:  [95 %-98 %] 95 %  Body mass index is 20 24 kg/m²  Input and Output Summary (last 24 hours): Intake/Output Summary (Last 24 hours) at 12/3/2019 1557  Last data filed at 2019 1800  Gross per 24 hour   Intake 240 ml   Output    Net 240 ml       Physical Exam:     Physical Exam   Constitutional: She appears well-developed and well-nourished  No distress  Cardiovascular: Normal rate  Pulmonary/Chest: Effort normal and breath sounds normal  No stridor  No respiratory distress  Abdominal: Soft  Bowel sounds are normal  She exhibits no distension  There is no tenderness  Skin: She is not diaphoretic  Nursing note and vitals reviewed  Additional Data:     Labs:    Results from last 7 days   Lab Units 19  0626 19  1945   WBC Thousand/uL 7 10 9 00   HEMOGLOBIN g/dL 13 2 13 5   HEMATOCRIT % 38 1* 38 6*   PLATELETS Thousands/uL 364 386   NEUTROS PCT %  --  73   LYMPHS PCT %  --  16*   MONOS PCT %  --  9   EOS PCT %  --  1     Results from last 7 days   Lab Units 19  194   POTASSIUM mmol/L 4 3 4 2   CHLORIDE mmol/L 106 104   CO2 mmol/L 28 29   BUN mg/dL 23 27*   CREATININE mg/dL 0 88 0 86   CALCIUM mg/dL 8 9 9 0   ALK PHOS U/L  --  46*   ALT U/L  --  10   AST U/L  --  16           * I Have Reviewed All Lab Data Listed Above  * Additional Pertinent Lab Tests Reviewed:  [address] Admission Reviewed    Recent Cultures (last 7 days):           Last 24 Hours Medication List:     Current Facility-Administered Medications:  acetaminophen 650 mg Oral Q6H PRN Raquel Saldana PA-C   enoxaparin 30 mg Subcutaneous Q24H [address] EMELI Low   losartan 50 mg Oral Daily Raquel Saldana PA-C   ondansetron 4 mg Intravenous Q6H PRN Dalia Mcburney, PA-C   oxyCODONE-acetaminophen 1 tablet Oral Q4H PRN Dalia Mcburney, PA-C        Today, Patient Was Seen By: Tyree Servin, DO

## 2019-12-03 NOTE — ASSESSMENT & PLAN NOTE
· Likely due to undiagnosed dementia and baseline cognitive impairment   · PT and OT input appreciated   · Per family, the patient was seen by area of aging and was deemed that she is not safe to be home alone  They are also very concerned about her well-being living alone at home  I discussed case with Dr Aura Khan  She states patient is not safe at home and has been failing to follow-up with appointments  This has been worsening since her  passed away  She agrees with having the patient placed in an assisted living/SNF    · Discussed with patient today and she is agreeable to go to a short-term rehab    Awaiting placement

## 2019-12-03 NOTE — PLAN OF CARE
Problem: OCCUPATIONAL THERAPY ADULT  Goal: Performs self-care activities at highest level of function for planned discharge setting  See evaluation for individualized goals  Description  Treatment Interventions: ADL retraining, Functional transfer training, UE strengthening/ROM, Endurance training, Cognitive reorientation, Equipment evaluation/education, Patient/family training, Compensatory technique education, Activityengagement          See flowsheet documentation for full assessment, interventions and recommendations  12/3/2019 1419 by Janann Galeazzi, OTA  Outcome: Progressing  Note:   Limitation: Decreased ADL status, Decreased UE strength, Decreased Safe judgement during ADL, Decreased cognition, Decreased endurance, Decreased self-care trans, Decreased high-level ADLs  Prognosis: Fair  Assessment: Patient participated in Skilled OT session this date with interventions consisting of ADL re training with the use of correct body mechnaics, safety awareness and fall prevention techniques, one handed dressing technique and  therapeutic activities to: increase activity tolerance   Also initiated ROM hand RUE to promote decreased edema  Patient agreeable to OT treatment session, upon arrival patient was found supine in bed (she readily agreed to self-care session out of bed)  Patient requiring verbal cues for correct technique and ocassional safety reminders - she does not seem to recall the purpose of the sling, uses/moves arm readily  Patient continues to be functioning below baseline level, occupational performance remains limited secondary to factors listed above and increased risk for falls and injury  From OT standpoint, recommendation at time of d/c would be Short Term Rehab, as patient wants to return home when able    Patient to benefit from continued Occupational Therapy treatment while in the hospital to address deficits as defined above and maximize level of functional independence with ADLs and functional mobility        OT Discharge Recommendation: Short Term Rehab  OT - OK to Discharge: Yes(Once medically cleared)     JEISON Ray/L

## 2019-12-03 NOTE — SOCIAL WORK
I met with Airam Rios this afternoon and Lisa from [facility] came to assess the pt, pt to be d/c when a bed is available  Cm will continue to follow, d/c plan was discussed at care coordination susan rai

## 2019-12-03 NOTE — ASSESSMENT & PLAN NOTE
· Venous duplex- no DVT   · Secondary to postop edema  · Supportive care   · Elevate extremity; sling  · Swelling is much improved  · PT/OT

## 2019-12-04 VITALS
BODY MASS INDEX: 19.51 KG/M2 | OXYGEN SATURATION: 97 % | SYSTOLIC BLOOD PRESSURE: 128 MMHG | TEMPERATURE: 98.8 F | DIASTOLIC BLOOD PRESSURE: 64 MMHG | HEIGHT: 62 IN | HEART RATE: 80 BPM | WEIGHT: 106.04 LBS | RESPIRATION RATE: 18 BRPM

## 2019-12-04 PROBLEM — M79.89 SWELLING OF RIGHT HAND: Status: RESOLVED | Noted: 2019-11-28 | Resolved: 2019-12-04

## 2019-12-04 PROCEDURE — 99239 HOSP IP/OBS DSCHRG MGMT >30: CPT | Performed by: PHYSICIAN ASSISTANT

## 2019-12-04 PROCEDURE — 97530 THERAPEUTIC ACTIVITIES: CPT

## 2019-12-04 PROCEDURE — 97110 THERAPEUTIC EXERCISES: CPT

## 2019-12-04 PROCEDURE — 97116 GAIT TRAINING THERAPY: CPT

## 2019-12-04 RX ORDER — ACETAMINOPHEN 325 MG/1
650 TABLET ORAL EVERY 6 HOURS PRN
Qty: 30 TABLET | Refills: 0
Start: 2019-12-04

## 2019-12-04 RX ORDER — OXYCODONE HYDROCHLORIDE AND ACETAMINOPHEN 5; 325 MG/1; MG/1
1 TABLET ORAL EVERY 4 HOURS PRN
Qty: 20 TABLET | Refills: 0 | Status: SHIPPED | OUTPATIENT
Start: 2019-12-04 | End: 2019-12-14

## 2019-12-04 RX ADMIN — LOSARTAN POTASSIUM 50 MG: 50 TABLET, FILM COATED ORAL at 08:58

## 2019-12-04 RX ADMIN — ENOXAPARIN SODIUM 30 MG: 30 INJECTION SUBCUTANEOUS at 08:58

## 2019-12-04 NOTE — PHYSICAL THERAPY NOTE
12/04/19 1202   Pain Assessment   Pain Assessment No/denies pain   Pain Score No Pain   General   Chart Reviewed Yes   Response to Previous Treatment Patient with no complaints from previous session  Family/Caregiver Present No   Cognition   Overall Cognitive Status WFL   Arousal/Participation Alert; Responsive; Cooperative   Attention Within functional limits   Orientation Level Disoriented to person;Disoriented to place; Disoriented to time;Disoriented to situation   Memory Decreased long term memory;Decreased recall of recent events;Decreased short term memory   Following Commands Follows one step commands without difficulty   Comments Pt agreeable to PT session   Subjective   Subjective "I want to go home"   Bed Mobility   Rolling R 5  Supervision   Additional items Bedrails; Increased time required;Verbal cues   Rolling L 5  Supervision   Additional items Bedrails;Verbal cues; Increased time required   Supine to Sit 5  Supervision   Additional items Assist x 1;Bedrails;Verbal cues   Additional Comments pt sitting OOB in chair at end of session   Transfers   Sit to Stand   (CGA)   Additional items Assist x 1;Verbal cues; Impulsive   Stand to Sit   (CGA)   Additional items Verbal cues   Ambulation/Elevation   Gait pattern Improper Weight shift; Short stride   Gait Assistance   (CGA)   Additional items Verbal cues; Assist x 1   Assistive Device   (HHA)   Distance 200ft   Balance   Static Sitting Good   Dynamic Sitting Fair +   Static Standing Fair   Dynamic Standing Fair   Ambulatory Fair   Endurance Deficit   Endurance Deficit No   Activity Tolerance   Activity Tolerance Patient tolerated treatment well   Exercises   Quad Sets 20 reps; Supine;AROM; Bilateral   Heelslides Supine;20 reps;AROM; Bilateral   Glute Sets Supine;20 reps;AROM; Bilateral   Hip Flexion Supine;20 reps;AROM; Bilateral   Hip Abduction Supine;20 reps;AROM; Bilateral   Hip Adduction Supine;20 reps;AROM; Bilateral   Knee AROM Short Arc Fileforce reps;AROM; Bilateral   Ankle Pumps Supine;20 reps;AROM; Bilateral   Assessment   Prognosis Fair   Problem List Decreased strength; Impaired balance;Decreased mobility; Impaired judgement;Decreased safety awareness;Orthopedic restrictions   Assessment Pt seen for PT treatment session this date with interventions consisting of gait training w/ emphasis on improving pt's ability to ambulate level surfaces x 200ft with CGA provided by therapist with no AD, Therapeutic exercise consisting of: AROM 20 reps B LE in supine position and therapeutic activity consisting of training: bed mobility, supine<>sit transfers and sit<>stand transfers  Pt agreeable to PT treatment session upon arrival, pt found supine in bed w/ HOB elevated, in no apparent distress and responsive  In comparison to previous session, pt with improvements in distance ambulated  Post session: bed alarm engaged and all needs in reach Continue to recommend STR at time of d/c in order to maximize pt's functional independence and safety w/ mobility  Pt continues to be functioning below baseline level, and remains limited 2* factors listed above and including decreased functional mobility, decreased safety awareness, decreased balance    PT will continue to see pt while here in order to address the deficits listed above and provide interventions consistent w/ POC in effort to achieve STGs  Barriers to Discharge Decreased caregiver support; Inaccessible home environment   Plan   Treatment/Interventions Functional transfer training;LE strengthening/ROM; Therapeutic exercise; Bed mobility;Gait training   Progress Progressing toward goals   PT Frequency Other (Comment)  (3-5x/wk)   Recommendation   Recommendation Short-term skilled PT   PT - OK to Discharge Yes  (when medically stable)

## 2019-12-04 NOTE — DISCHARGE INSTRUCTIONS
Chronic Hypertension   WHAT YOU NEED TO KNOW:   Hypertension is high blood pressure (BP)  Your BP is the force of your blood moving against the walls of your arteries  Normal BP is less than 120/80  Prehypertension is between 120/80 and 139/89  Hypertension is 140/90 or higher  Hypertension causes your BP to get so high that your heart has to work much harder than normal  This can damage your heart  Chronic hypertension is a long-term condition that you can control with a healthy lifestyle or medicines  A controlled blood pressure helps protect your organs, such as your heart, lungs, brain, and kidneys  DISCHARGE INSTRUCTIONS:   Call 911 for any of the following:   · You have discomfort in your chest that feels like squeezing, pressure, fullness, or pain  · You become confused or have difficulty speaking  · You suddenly feel lightheaded or have trouble breathing  · You have pain or discomfort in your back, neck, jaw, stomach, or arm  Seek care immediately if:   · You have a severe headache or vision loss  · You have weakness in an arm or leg  Contact your healthcare provider if:   · You feel faint, dizzy, confused, or drowsy  · You have been taking your BP medicine and your BP is still higher than your healthcare provider says it should be  · You have questions or concerns about your condition or care  Medicines: You may need any of the following:  · Medicine  may be used to help lower your BP  You may need more than one type of medicine  Take the medicine exactly as directed  · Diuretics  help decrease extra fluid that collects in your body  This will help lower your BP  You may urinate more often while you take this medicine  · Cholesterol medicine  helps lower your cholesterol level  A low cholesterol level helps prevent heart disease and makes it easier to control your blood pressure  · Take your medicine as directed    Contact your healthcare provider if you think your medicine is not helping or if you have side effects  Tell him or her if you are allergic to any medicine  Keep a list of the medicines, vitamins, and herbs you take  Include the amounts, and when and why you take them  Bring the list or the pill bottles to follow-up visits  Carry your medicine list with you in case of an emergency  Follow up with your healthcare provider as directed: You will need to return to have your blood pressure checked and to have other lab tests done  Write down your questions so you remember to ask them during your visits  Manage chronic hypertension:  Talk with your healthcare provider about these and other ways to manage hypertension:  · Take your BP at home  Sit and rest for 5 minutes before you take your BP  Extend your arm and support it on a flat surface  Your arm should be at the same level as your heart  Follow the directions that came with your BP monitor  If possible, take at least 2 BP readings each time  Take your BP at least twice a day at the same times each day, such as morning and evening  Keep a record of your BP readings and bring it to your follow-up visits  Ask your healthcare provider what your blood pressure should be  · Limit sodium (salt) as directed  Too much sodium can affect your fluid balance  Check labels to find low-sodium or no-salt-added foods  Some low-sodium foods use potassium salts for flavor  Too much potassium can also cause health problems  Your healthcare provider will tell you how much sodium and potassium are safe for you to have in a day  He or she may recommend that you limit sodium to 2,300 mg a day  · Follow the meal plan recommended by your healthcare provider  A dietitian or your provider can give you more information on low-sodium plans or the DASH (Dietary Approaches to Stop Hypertension) eating plan  The DASH plan is low in sodium, unhealthy fats, and total fat  It is high in potassium, calcium, and fiber  · Exercise to maintain a healthy weight  Exercise at least 30 minutes per day, on most days of the week  This will help decrease your blood pressure  Ask about the best exercise plan for you  · Decrease stress  This may help lower your BP  Learn ways to relax, such as deep breathing or listening to music  · Limit alcohol  Women should limit alcohol to 1 drink a day  Men should limit alcohol to 2 drinks a day  A drink of alcohol is 12 ounces of beer, 5 ounces of wine, or 1½ ounces of liquor  · Do not smoke  Nicotine and other chemicals in cigarettes and cigars can increase your BP and also cause lung damage  Ask your healthcare provider for information if you currently smoke and need help to quit  E-cigarettes or smokeless tobacco still contain nicotine  Talk to your healthcare provider before you use these products  © 2017 [employer] Information is for End User's use only and may not be sold, redistributed or otherwise used for commercial purposes  All illustrations and images included in CareNotes® are the copyrighted property of [employer]  or [employer]  The above information is an  only  It is not intended as medical advice for individual conditions or treatments  Talk to your doctor, nurse or pharmacist before following any medical regimen to see if it is safe and effective for you  Arm Fracture in Adults   WHAT YOU NEED TO KNOW:   An arm fracture is a crack or break in one or more of the bones in your arm  An arm fracture may be caused by a fall onto an outstretched hand  It may also be caused by trauma from a car accident or a sports injury  Osteoporosis (brittle bones) can increase your risk for a fracture  DISCHARGE INSTRUCTIONS:   Seek care immediately if:   · The pain in your injured arm does not get better or gets worse, even after you rest and take medicine  · Your injured arm, hand, or fingers feel numb      · Your arm is swollen, red, and feels warm  · Your skin over the arm fracture is swollen, cold, or pale  · You cannot move your arm, hand, or fingers  Contact your healthcare provider if:   · You have a fever  · Your brace or splint becomes wet, damaged, comes off  · You have questions or concerns about your injury, treatment, or care  Medicines:   · NSAIDs , such as ibuprofen, help decrease swelling and pain  This medicine is available with or without a doctor's order  NSAIDs can cause stomach bleeding or kidney problems in certain people  If you take blood thinner medicine, always ask your healthcare provider if NSAIDs are safe for you  Always read the medicine label and follow directions  · Acetaminophen  decreases pain  It is available without a doctor's order  Ask how much to take and how often to take it  Follow directions  Acetaminophen can cause liver damage if not taken correctly  · Prescription pain medicine  may be given  Ask how to take this medicine safely  · Take your medicine as directed  Contact your healthcare provider if you think your medicine is not helping or if you have side effects  Tell him or her if you are allergic to any medicine  Keep a list of the medicines, vitamins, and herbs you take  Include the amounts, and when and why you take them  Bring the list or the pill bottles to follow-up visits  Carry your medicine list with you in case of an emergency  Follow up with your healthcare provider within 1 week: You may need to see a bone specialist within 3 to 4 days if you need surgery or further treatment for your arm fracture  Write down your questions so you remember to ask them during your visits  Rest:  You should rest your arm as much as possible  Ask your healthcare provider when you can put pressure or weight on your arm  Also ask when you can return to sports or vigorous exercises  Ice:  Apply ice on your arm for 15 to 20 minutes every hour or as directed   Use an ice pack, or put crushed ice in a plastic bag  Cover it with a towel  Ice helps prevent tissue damage and decreases swelling and pain  Elevate:  Elevate your arm above the level of your heart as often as you can  This will help decrease swelling and pain  Prop your arm on pillows or blankets to keep it elevated comfortably  Care for your cast or splint:  Ask your healthcare provider when it is okay to bathe  Do not get your cast or splint wet  Before you take a bath or shower, cover your cast or splint with a plastic bag  Tape the bag to your skin to help keep water out  Hold your arm away from the water in case the bag leaks  · Check the skin around your cast or splint each day for any redness or open skin  · Do not use a sharp or pointed object to scratch your skin under the cast or splint  Physical therapy:  A physical therapist teaches you exercises to help improve movement and strength, and to decrease pain  © 2017 [facility] [facility] Information is for End User's use only and may not be sold, redistributed or otherwise used for commercial purposes  All illustrations and images included in CareNotes® are the copyrighted property of [employer]  or [employer] Calle[employer]  The above information is an  only  It is not intended as medical advice for individual conditions or treatments  Talk to your doctor, nurse or pharmacist before following any medical regimen to see if it is safe and effective for you

## 2019-12-04 NOTE — NURSING NOTE
Patient's blood pressure found to be 188/77  Hospitalist notified and new order for as needed hydralazine received  Will administer as appropriate

## 2019-12-04 NOTE — PLAN OF CARE
Problem: Potential for Falls  Goal: Patient will remain free of falls  Description  INTERVENTIONS:  - Assess patient frequently for physical needs  -  Identify cognitive and physical deficits and behaviors that affect risk of falls    -  [place] fall precautions as indicated by assessment   - Educate patient/family on patient safety including physical limitations  - Instruct patient to call for assistance with activity based on assessment  - Modify environment to reduce risk of injury  - Consider OT/PT consult to assist with strengthening/mobility  12/4/2019 1515 by Mark Newton RN  Outcome: Adequate for Discharge  12/4/2019 0905 by Mark Newton RN  Outcome: Progressing     Problem: PAIN - ADULT  Goal: Verbalizes/displays adequate comfort level or baseline comfort level  Description  Interventions:  - Encourage patient to monitor pain and request assistance  - Assess pain using appropriate pain scale  - Administer analgesics based on type and severity of pain and evaluate response  - Implement non-pharmacological measures as appropriate and evaluate response  - Consider cultural and social influences on pain and pain management  - Notify physician/advanced practitioner if interventions unsuccessful or patient reports new pain  12/4/2019 1515 by Mark Newton RN  Outcome: Adequate for Discharge  12/4/2019 0905 by Mark Newton RN  Outcome: Progressing     Problem: INFECTION - ADULT  Goal: Absence or prevention of progression during hospitalization  Description  INTERVENTIONS:  - Assess and monitor for signs and symptoms of infection  - Monitor lab/diagnostic results  - Monitor all insertion sites, i e  indwelling lines, tubes, and drains  - Monitor endotracheal if appropriate and nasal secretions for changes in amount and color  - [place] appropriate cooling/warming therapies per order  - Administer medications as ordered  - Instruct and encourage patient and family to use good hand hygiene technique  - Identify and instruct in appropriate isolation precautions for identified infection/condition  12/4/2019 1515 by Clarence Benoit RN  Outcome: Adequate for Discharge  12/4/2019 0905 by Clarence Benoit RN  Outcome: Progressing  Goal: Absence of fever/infection during neutropenic period  Description  INTERVENTIONS:  - Monitor WBC    12/4/2019 1515 by Clarence Benoit RN  Outcome: Adequate for Discharge  12/4/2019 0905 by Clarence Benoit RN  Outcome: Progressing     Problem: SAFETY ADULT  Goal: Patient will remain free of falls  Description  INTERVENTIONS:  - Assess patient frequently for physical needs  -  Identify cognitive and physical deficits and behaviors that affect risk of falls    -  [place] fall precautions as indicated by assessment   - Educate patient/family on patient safety including physical limitations  - Instruct patient to call for assistance with activity based on assessment  - Modify environment to reduce risk of injury  - Consider OT/PT consult to assist with strengthening/mobility  12/4/2019 1515 by Clarence Benoit RN  Outcome: Adequate for Discharge  12/4/2019 0905 by Clarence Benoit RN  Outcome: Progressing  Goal: Maintain or return to baseline ADL function  Description  INTERVENTIONS:  -  Assess patient's ability to carry out ADLs; assess patient's baseline for ADL function and identify physical deficits which impact ability to perform ADLs (bathing, care of mouth/teeth, toileting, grooming, dressing, etc )  - Assess/evaluate cause of self-care deficits   - Assess range of motion  - Assess patient's mobility; develop plan if impaired  - Assess patient's need for assistive devices and provide as appropriate  - Encourage maximum independence but intervene and supervise when necessary  - Involve family in performance of ADLs  - Assess for home care needs following discharge   - Consider OT consult to assist with ADL evaluation and planning for discharge  - Provide patient education as appropriate  12/4/2019 1515 by Foster Azar RN  Outcome: Adequate for Discharge  12/4/2019 0905 by Foster Azar RN  Outcome: Progressing  Goal: Maintain or return mobility status to optimal level  Description  INTERVENTIONS:  - Assess patient's baseline mobility status (ambulation, transfers, stairs, etc )    - Identify cognitive and physical deficits and behaviors that affect mobility  - Identify mobility aids required to assist with transfers and/or ambulation (gait belt, sit-to-stand, lift, walker, cane, etc )  - [place] fall precautions as indicated by assessment  - Record patient progress and toleration of activity level on Mobility SBAR; progress patient to next Phase/Stage  - Instruct patient to call for assistance with activity based on assessment  - Consider rehabilitation consult to assist with strengthening/weightbearing, etc   12/4/2019 1515 by Fosetr Azar RN  Outcome: Adequate for Discharge  12/4/2019 0905 by Foster Azar RN  Outcome: Progressing     Problem: DISCHARGE PLANNING  Goal: Discharge to home or other facility with appropriate resources  Description  INTERVENTIONS:  - Identify barriers to discharge w/patient and caregiver  - Arrange for needed discharge resources and transportation as appropriate  - Identify discharge learning needs (meds, wound care, etc )  - Arrange for interpretive services to assist at discharge as needed  - Refer to Case Management Department for coordinating discharge planning if the patient needs post-hospital services based on physician/advanced practitioner order or complex needs related to functional status, cognitive ability, or social support system  12/4/2019 1515 by Foster Azar RN  Outcome: Adequate for Discharge  12/4/2019 0905 by Foster Azar RN  Outcome: Progressing     Problem: Knowledge Deficit  Goal: Patient/family/caregiver demonstrates understanding of disease process, treatment plan, medications, and discharge instructions  Description  Complete learning assessment and assess knowledge base    Interventions:  - Provide teaching at level of understanding  - Provide teaching via preferred learning methods  12/4/2019 1515 by Jero Chan RN  Outcome: Adequate for Discharge  12/4/2019 0905 by Jero Chan RN  Outcome: Progressing     Problem: DISCHARGE PLANNING - CARE MANAGEMENT  Goal: Discharge to post-acute care or home with appropriate resources  Description  INTERVENTIONS:  - Conduct assessment to determine patient/family and health care team treatment goals, and need for post-acute services based on payer coverage, community resources, and patient preferences, and barriers to discharge  - Address psychosocial, clinical, and financial barriers to discharge as identified in assessment in conjunction with the patient/family and health care team  - Arrange appropriate level of post-acute services according to patient's   needs and preference and payer coverage in collaboration with the physician and health care team  - Communicate with and update the patient/family, physician, and health care team regarding progress on the discharge plan  - Arrange appropriate transportation to post-acute venues    Pt's goal is to return home after some rehab   Outcome: Progressing     Problem: Prexisting or High Potential for Compromised Skin Integrity  Goal: Skin integrity is maintained or improved  Description  INTERVENTIONS:  - Identify patients at risk for skin breakdown  - Assess and monitor skin integrity  - Assess and monitor nutrition and hydration status  - Monitor labs   - Assess for incontinence   - Turn and reposition patient  - Assist with mobility/ambulation  - Relieve pressure over bony prominences  - Avoid friction and shearing  - Provide appropriate hygiene as needed including keeping skin clean and dry  - Evaluate need for skin moisturizer/barrier cream  - Collaborate with interdisciplinary team   - Patient/family teaching  - Consider wound care consult   12/4/2019 1515 by Lacey Farrell RN  Outcome: Adequate for Discharge  12/4/2019 0905 by Lacey Farrell RN  Outcome: Progressing

## 2019-12-04 NOTE — PROGRESS NOTES
Progress Note - Keeley Bocanegra 1/1/1928, 80 y o  female MRN: [id number]    Unit/Bed#: -02 Encounter: [id number]    Primary Care Provider: Jennifer Jang DO   Date and time admitted to hospital: 11/28/2019  7:10 PM        * Swelling of right handresolved as of 12/4/2019  Assessment & Plan  · Venous duplex- no DVT   · Secondary to postop edema  · Supportive care   · Elevate extremity; sling  · Swelling is much improved  · PT/OT           Impaired mobility and ADLs  Assessment & Plan  · Likely due to undiagnosed dementia and baseline cognitive impairment   · PT and OT input appreciated   · Per family, the patient was seen by area of aging and was deemed that she is not safe to be home alone  They are also very concerned about her well-being living alone at home  I discussed case with Dr Sheryl Blank  She states patient is not safe at home and has been failing to follow-up with appointments  This has been worsening since her  passed away  She agrees with having the patient placed in an assisted living/SNF    · Discussed with patient today and she is agreeable to go to a short-term rehab  · Patient will be discharged to SNF rehab today      Benign essential HTN  Assessment & Plan  · Patient is hypertensive on presentation and reportedly takes Cozaar at home though doses unclear  · BP is much improved   · Continue with Cozaar         Closed fracture of right distal radius and ulna  Assessment & Plan  · Patient to follow up with ortho as previously scheduled on 12/6/19  · Pain control        Discharging Physician / Practitioner: Sophie Fox PA-C  PCP: Jennifer Jang DO  Admission Date:   Admission Orders (From admission, onward)     Ordered        11/29/19 1750  Inpatient Admission  Once         11/28/19 2109  Place in Observation  Once                   Discharge Date: 12/04/19    Disposition:      Other: SNF rehab    For Discharges to [facility] SNF:   · Not Applicable to this Patient - Not Applicable to this Patient    Reason for Admission: Right hand swelling    Discharge Diagnoses:     Please see assessment and plan section above for further details regarding discharge diagnoses  Resolved Problems  Date Reviewed: 12/4/2019          Resolved    * (Principal) Swelling of right hand 12/4/2019     Resolved by  Margarita Park, [address] [place] [address] Stay:  · None    Procedures Performed:   · None     Medication Adjustments and Discharge Medications:  · Summary of Medication Adjustments made as a result of this hospitalization: Tylenol prn pain  · Medication Dosing Tapers - Please refer to Discharge Medication List for details on any medication dosing tapers (if applicable to patient)  · Medications being temporarily held (include recommended restart time): N/A  · Discharge Medication List: See after visit summary for reconciled discharge medications  Wound Care Recommendations:  When applicable, please see wound care section of After Visit Summary  Diet Recommendations at Discharge:  Diet - House diet    Instructions for any Catheters / Lines Present at Discharge (including removal date, if applicable): N/A    Significant Findings / Test Results:     VAS upper limb venous duplex scan, unilateral/limited   Final Result by Colten Oneal MD (11/29 3937)            Incidental Findings:   · None     Test Results Pending at Discharge (will require follow up): · None     Outpatient Tests Requested:  · None    Complications:    · None    Hospital Course: Diana Jose is a 80 y o  female patient who originally presented to the hospital on 11/28/2019 due to right hand swelling  Patient recently had an ORIF of the right distal radius on 11/26/19 at the [address] [place] [address] after a fall  Patient was discharged home where she resides alone  She was noted by visiting nurses to have profound swelling of her right hand   Patient had not been compliant with her sling which was prescribed upon discharge  Patient is a poor historian and her family was concerned that she may be in the early stages of dementia  They felt that it was not safe for her to live alone anymore  Patient was evaluated by PT/OT during admission and is now agreeable to short term rehab  Patient had a venous duplex of the upper extremities which was negative for DVT  It was suspected that her swelling was due to post op edema  Condition at Discharge: stable     Discharge Day Visit / Exam:     Subjective:  Patient's wrist pain is controlled  Swelling improved  No CP/SOB  Vitals: Blood Pressure: 128/64 (12/04/19 0700)  Pulse: 80 (12/04/19 0700)  Temperature: 98 8 °F (37 1 °C) (12/04/19 0700)  Temp Source: Temporal (12/04/19 0700)  Respirations: 18 (12/04/19 0700)  Height: 5' 2" (157 5 cm) (11/28/19 2212)  Weight - Scale: 48 1 kg (106 lb 0 7 oz) (12/04/19 0559)  SpO2: 97 % (12/04/19 0700)  Exam:   Physical Exam   Constitutional: No distress  Sitting OOB in chair   HENT:   Head: Normocephalic and atraumatic  Mouth/Throat: Oropharynx is clear and moist    Neck: Neck supple  Cardiovascular: Normal rate and regular rhythm  Pulmonary/Chest: Breath sounds normal  No respiratory distress  She has no wheezes  Abdominal: Soft  Bowel sounds are normal  She exhibits no distension  There is no tenderness  Musculoskeletal:   Mild swelling of right wrist, RUE in sling   Lymphadenopathy:     She has no cervical adenopathy  Neurological: She is alert  Skin: Skin is warm and dry  No erythema  Right wrist incision C/D/I, sutures in place        Goals of Care Discussions:  · Code Status at Discharge: Prior  · Were there any Goals of Care Discussions during Hospitalization?: No  · Results of any General Goals of Care Discussions: N/A   · POLST Completed: No   · If POLST Completed, Summary of POLST Agreement Provided Here: N/A   · OK to Rehospitalize if Needed?  Yes    Discharge instructions/Information to patient and family:   See after visit summary section titled Discharge Instructions for information provided to patient and family  Planned Readmission: None      Discharge Statement:  I spent greater than 30 minutes minutes discharging the patient  This time was spent on the day of discharge  I had direct contact with the patient on the day of discharge  Greater than 50% of the total time was spent examining patient, answering all patient questions, arranging and discussing plan of care with patient as well as directly providing post-discharge instructions  Additional time then spent on discharge activities      ** Please Note: This note has been constructed using a voice recognition system **

## 2019-12-04 NOTE — NURSING NOTE
Discharge instructions and medication reviewed with pt  verbalize understanding of same  IV removed, without difficulties, pressure applied  pt dressed  all belongings with pt  Pt transported by Talib W/MEI Bergeron  Report given to RN at [facility]

## 2019-12-04 NOTE — PLAN OF CARE
Problem: INFECTION - ADULT  Goal: Absence or prevention of progression during hospitalization  Description  INTERVENTIONS:  - Assess and monitor for signs and symptoms of infection  - Monitor lab/diagnostic results  - Monitor all insertion sites, i e  indwelling lines, tubes, and drains  - Monitor endotracheal if appropriate and nasal secretions for changes in amount and color  - [place] appropriate cooling/warming therapies per order  - Administer medications as ordered  - Instruct and encourage patient and family to use good hand hygiene technique  - Identify and instruct in appropriate isolation precautions for identified infection/condition  Outcome: Progressing  Goal: Absence of fever/infection during neutropenic period  Description  INTERVENTIONS:  - Monitor WBC    Outcome: Progressing     Problem: SAFETY ADULT  Goal: Patient will remain free of falls  Description  INTERVENTIONS:  - Assess patient frequently for physical needs  -  Identify cognitive and physical deficits and behaviors that affect risk of falls    -  [place] fall precautions as indicated by assessment   - Educate patient/family on patient safety including physical limitations  - Instruct patient to call for assistance with activity based on assessment  - Modify environment to reduce risk of injury  - Consider OT/PT consult to assist with strengthening/mobility  Outcome: Progressing  Goal: Maintain or return to baseline ADL function  Description  INTERVENTIONS:  -  Assess patient's ability to carry out ADLs; assess patient's baseline for ADL function and identify physical deficits which impact ability to perform ADLs (bathing, care of mouth/teeth, toileting, grooming, dressing, etc )  - Assess/evaluate cause of self-care deficits   - Assess range of motion  - Assess patient's mobility; develop plan if impaired  - Assess patient's need for assistive devices and provide as appropriate  - Encourage maximum independence but intervene and supervise when necessary  - Involve family in performance of ADLs  - Assess for home care needs following discharge   - Consider OT consult to assist with ADL evaluation and planning for discharge  - Provide patient education as appropriate  Outcome: Progressing  Goal: Maintain or return mobility status to optimal level  Description  INTERVENTIONS:  - Assess patient's baseline mobility status (ambulation, transfers, stairs, etc )    - Identify cognitive and physical deficits and behaviors that affect mobility  - Identify mobility aids required to assist with transfers and/or ambulation (gait belt, sit-to-stand, lift, walker, cane, etc )  - [place] fall precautions as indicated by assessment  - Record patient progress and toleration of activity level on Mobility SBAR; progress patient to next Phase/Stage  - Instruct patient to call for assistance with activity based on assessment  - Consider rehabilitation consult to assist with strengthening/weightbearing, etc   Outcome: Progressing     Problem: PAIN - ADULT  Goal: Verbalizes/displays adequate comfort level or baseline comfort level  Description  Interventions:  - Encourage patient to monitor pain and request assistance  - Assess pain using appropriate pain scale  - Administer analgesics based on type and severity of pain and evaluate response  - Implement non-pharmacological measures as appropriate and evaluate response  - Consider cultural and social influences on pain and pain management  - Notify physician/advanced practitioner if interventions unsuccessful or patient reports new pain  Outcome: Progressing

## 2019-12-04 NOTE — ASSESSMENT & PLAN NOTE
· Likely due to undiagnosed dementia and baseline cognitive impairment   · PT and OT input appreciated   · Per family, the patient was seen by area of aging and was deemed that she is not safe to be home alone  They are also very concerned about her well-being living alone at home  I discussed case with Dr Kike Olsen  She states patient is not safe at home and has been failing to follow-up with appointments  This has been worsening since her  passed away  She agrees with having the patient placed in an assisted living/SNF    · Discussed with patient today and she is agreeable to go to a short-term rehab  · Patient will be discharged to SNF rehab today

## 2019-12-04 NOTE — SOCIAL WORK
Chart reviewed , d/c order written , [facility], they were unable to accept u til I could get her new medicare # , her niece did not have and the doctor;s office did no have the new#t, I was able to obtain her correct # and called Denzel Michaud, I made her aware and the pt was able to be accepted, pt had a 2pm star w/c [facility] set up, I called Sherrie Rivera two times today to coordinate the d/c , I called at 12;:25 and left a message for her to call me back and she did, she is in agreement with the d/c an d/c plan to [facility], pt is also in agreement, she was made aware her dog can come to visit her , pt d/c to [address] [place] [address] for rehab

## 2019-12-04 NOTE — PLAN OF CARE
Problem: PHYSICAL THERAPY ADULT  Goal: Performs mobility at highest level of function for planned discharge setting  See evaluation for individualized goals  Description  Treatment/Interventions: Functional transfer training, LE strengthening/ROM, Elevations, Therapeutic exercise, Gait training  Equipment Recommended: Cane       See flowsheet documentation for full assessment, interventions and recommendations  Outcome: Progressing  Note:   Prognosis: Fair  Problem List: Decreased strength, Impaired balance, Decreased mobility, Impaired judgement, Decreased safety awareness, Orthopedic restrictions  Assessment: Pt seen for PT treatment session this date with interventions consisting of gait training w/ emphasis on improving pt's ability to ambulate level surfaces x 200ft with CGA provided by therapist with no AD, Therapeutic exercise consisting of: AROM 20 reps B LE in supine position and therapeutic activity consisting of training: bed mobility, supine<>sit transfers and sit<>stand transfers  Pt agreeable to PT treatment session upon arrival, pt found supine in bed w/ HOB elevated, in no apparent distress and responsive  In comparison to previous session, pt with improvements in distance ambulated  Post session: bed alarm engaged and all needs in reach Continue to recommend STR at time of d/c in order to maximize pt's functional independence and safety w/ mobility  Pt continues to be functioning below baseline level, and remains limited 2* factors listed above and including decreased functional mobility, decreased safety awareness, decreased balance    PT will continue to see pt while here in order to address the deficits listed above and provide interventions consistent w/ POC in effort to achieve STGs    Barriers to Discharge: Decreased caregiver support, Inaccessible home environment     Recommendation: Short-term skilled PT     PT - OK to Discharge: Yes(when medically stable)    See flowsheet documentation for full assessment  83

## 2019-12-04 NOTE — DISCHARGE SUMMARY
Discharge- Joseph Uribe 1/1/1928, 80 y o  female MRN: [id number]    Unit/Bed#: -02 Encounter: [id number]    Primary Care Provider: Lisset Thomas DO   Date and time admitted to hospital: 11/28/2019  7:10 PM      * Swelling of right handresolved as of 12/4/2019  Assessment & Plan  · Venous duplex- no DVT   · Secondary to postop edema  · Supportive care   · Elevate extremity; sling  ? Swelling is much improved  · PT/OT               Impaired mobility and ADLs  Assessment & Plan  · Likely due to undiagnosed dementia and baseline cognitive impairment   · PT and OT input appreciated   · Per family, the patient was seen by area of aging and was deemed that she is not safe to be home alone  They are also very concerned about her well-being living alone at home  I discussed case with Dr Patience Ash  She states patient is not safe at home and has been failing to follow-up with appointments  This has been worsening since her  passed away  She agrees with having the patient placed in an assisted living/SNF    · Discussed with patient today and she is agreeable to go to a short-term rehab  · Patient will be discharged to SNF rehab today        Benign essential HTN  Assessment & Plan  · Patient is hypertensive on presentation and reportedly takes Cozaar at home though doses unclear  · BP is much improved   · Continue with Cozaar            Closed fracture of right distal radius and ulna  Assessment & Plan  · Patient to follow up with ortho as previously scheduled on 12/6/19  · Pain control           Discharging Physician / Practitioner: Marina Zuniga PA-C  PCP: Lisset Thomas DO  Admission Date:       Admission Orders (From admission, onward)              Ordered          11/29/19 1750   Inpatient Admission  Once           11/28/19 2109   Place in Observation  Once                       Discharge Date: 12/04/19     Disposition:       Other: SNF rehab     For Discharges to [facility] SNF: · Not Applicable to this Patient - Not Applicable to this Patient     Reason for Admission: Right hand swelling     Discharge Diagnoses:      Please see assessment and plan section above for further details regarding discharge diagnoses                Resolved Problems  Date Reviewed: 12/4/2019           Resolved     * (Principal) Swelling of right hand 12/4/2019       Resolved by  Jeison Desai PA-C             Consultations During Hospital Stay:  · None     Procedures Performed:   · None      Medication Adjustments and Discharge Medications:  · Summary of Medication Adjustments made as a result of this hospitalization: Tylenol prn pain  · Medication Dosing Tapers - Please refer to Discharge Medication List for details on any medication dosing tapers (if applicable to patient)  · Medications being temporarily held (include recommended restart time): N/A  · Discharge Medication List: See after visit summary for reconciled discharge medications       Wound Care Recommendations:  When applicable, please see wound care section of After Visit Summary      Diet Recommendations at Discharge:  Diet - House diet     Instructions for any Catheters / Lines Present at Discharge (including removal date, if applicable): N/A     Significant Findings / Test Results:      VAS upper limb venous duplex scan, unilateral/limited   Final Result by Paulo Burdick MD (11/29 2337)                Incidental Findings:   · None      Test Results Pending at Discharge (will require follow up): · None     Outpatient Tests Requested:  · None     Complications:    · None     Hospital Course:      Tamar Raymond is a 80 y o  female patient who originally presented to the hospital on 11/28/2019 due to right hand swelling  Patient recently had an ORIF of the right distal radius on 11/26/19 at the [address] [place] [address] after a fall  Patient was discharged home where she resides alone   She was noted by visiting nurses to have profound swelling of her right hand  Patient had not been compliant with her sling which was prescribed upon discharge  Patient is a poor historian and her family was concerned that she may be in the early stages of dementia  They felt that it was not safe for her to live alone anymore  Patient was evaluated by PT/OT during admission and is now agreeable to short term rehab       Patient had a venous duplex of the upper extremities which was negative for DVT  It was suspected that her swelling was due to post op edema      Condition at Discharge: stable      Discharge Day Visit / Exam:      Subjective:  Patient's wrist pain is controlled  Swelling improved  No CP/SOB  Vitals: Blood Pressure: 128/64 (12/04/19 0700)  Pulse: 80 (12/04/19 0700)  Temperature: 98 8 °F (37 1 °C) (12/04/19 0700)  Temp Source: Temporal (12/04/19 0700)  Respirations: 18 (12/04/19 0700)  Height: 5' 2" (157 5 cm) (11/28/19 2212)  Weight - Scale: 48 1 kg (106 lb 0 7 oz) (12/04/19 0559)  SpO2: 97 % (12/04/19 0700)  Exam:   Physical Exam   Constitutional: No distress  Sitting OOB in chair   HENT:   Head: Normocephalic and atraumatic  Mouth/Throat: Oropharynx is clear and moist    Neck: Neck supple  Cardiovascular: Normal rate and regular rhythm  Pulmonary/Chest: Breath sounds normal  No respiratory distress  She has no wheezes  Abdominal: Soft  Bowel sounds are normal  She exhibits no distension  There is no tenderness  Musculoskeletal:   Mild swelling of right wrist, RUE in sling   Lymphadenopathy:     She has no cervical adenopathy  Neurological: She is alert  Skin: Skin is warm and dry  No erythema     Right wrist incision C/D/I, sutures in place         Goals of Care Discussions:  · Code Status at Discharge: Prior  · Were there any Goals of Care Discussions during Hospitalization?: No  · Results of any General Goals of Care Discussions: N/A   · POLST Completed: No   · If POLST Completed, Summary of POLST Agreement Provided Here: N/A · OK to Rehospitalize if Needed? Yes     Discharge instructions/Information to patient and family:   See after visit summary section titled Discharge Instructions for information provided to patient and family        Planned Readmission: None      Discharge Statement:  I spent greater than 30 minutes minutes discharging the patient  This time was spent on the day of discharge  I had direct contact with the patient on the day of discharge  Greater than 50% of the total time was spent examining patient, answering all patient questions, arranging and discussing plan of care with patient as well as directly providing post-discharge instructions    Additional time then spent on discharge activities      ** Please Note: This note has been constructed using a voice recognition system **

## 2019-12-05 ENCOUNTER — TELEPHONE (OUTPATIENT)
Dept: OBGYN CLINIC | Facility: HOSPITAL | Age: 84
End: 2019-12-05

## 2019-12-05 NOTE — TELEPHONE ENCOUNTER
Dallin from [facility] is calling in reference to the patient's post op scheduled for tomorrow, 12/6 at 10  [facility] just received the patient last night and they do not have enough time to arrange transportation for this appointment  Patient needs to be seen early next week  Please call Dallin at [address]

## 2019-12-05 NOTE — TELEPHONE ENCOUNTER
Thank you  She does need to follow up next week with us  As we are not in [place] on Friday, she needs to follow up with us in [facility]  Thank you

## 2019-12-05 NOTE — TELEPHONE ENCOUNTER
Please be advise I called Corky Valero who is an RN and also in charge of transportation   I left her a message with my return phone number for a new appt  Pt  Is post op and Deandre is unable to get pt  To appt  Tomorrow  I will try and get pt  Set up to see you/ in [place] early next week if that is ok  Please advise   Thanks

## 2019-12-06 ENCOUNTER — OFFICE VISIT (OUTPATIENT)
Dept: OBGYN CLINIC | Facility: CLINIC | Age: 84
End: 2019-12-06

## 2019-12-06 ENCOUNTER — EVALUATION (OUTPATIENT)
Dept: OCCUPATIONAL THERAPY | Facility: CLINIC | Age: 84
End: 2019-12-06
Payer: COMMERCIAL

## 2019-12-06 ENCOUNTER — APPOINTMENT (OUTPATIENT)
Dept: RADIOLOGY | Facility: CLINIC | Age: 84
End: 2019-12-06
Payer: MEDICARE

## 2019-12-06 VITALS
HEART RATE: 80 BPM | HEIGHT: 62 IN | DIASTOLIC BLOOD PRESSURE: 67 MMHG | WEIGHT: 106 LBS | SYSTOLIC BLOOD PRESSURE: 114 MMHG | BODY MASS INDEX: 19.51 KG/M2

## 2019-12-06 DIAGNOSIS — S52.501D CLOSED FRACTURE OF DISTAL ENDS OF RIGHT RADIUS AND ULNA WITH ROUTINE HEALING, SUBSEQUENT ENCOUNTER: Primary | ICD-10-CM

## 2019-12-06 DIAGNOSIS — Z48.89 AFTERCARE FOLLOWING SURGERY: Primary | ICD-10-CM

## 2019-12-06 DIAGNOSIS — Z48.89 AFTERCARE FOLLOWING SURGERY: ICD-10-CM

## 2019-12-06 DIAGNOSIS — S52.601D CLOSED FRACTURE OF DISTAL ENDS OF RIGHT RADIUS AND ULNA WITH ROUTINE HEALING, SUBSEQUENT ENCOUNTER: Primary | ICD-10-CM

## 2019-12-06 PROCEDURE — 97166 OT EVAL MOD COMPLEX 45 MIN: CPT

## 2019-12-06 PROCEDURE — L3906 WHO W/O JOINTS CF: HCPCS

## 2019-12-06 PROCEDURE — 73110 X-RAY EXAM OF WRIST: CPT

## 2019-12-06 PROCEDURE — 99024 POSTOP FOLLOW-UP VISIT: CPT | Performed by: ORTHOPAEDIC SURGERY

## 2019-12-06 NOTE — PROGRESS NOTES
SUBJECTIVE:  Rufina Baker is a 80y o  year old female who presents for follow up after surgery, right distal radius ORIF performed on  11/26/2019  Today patient has been doing well with minimal discomfort  Patient went to the emergency room for hand swelling 2 days after her surgery  She was then placed to [place] [address] facility  She has not had a wrist brace on since being seen in the hospital   She has difficulty making a full fist secondary to stiffness in her fingers  She has been wearing the sling for comfort  She denies any numbness or tingling  VITALS:  Vitals:    12/06/19 1019   BP: 114/67   Pulse: 80       PHYSICAL EXAMINATION:  General: well developed and well nourished, alert, oriented times 3 and appears comfortable  Psychiatric: Normal    MUSCULOSKELETAL EXAMINATION:  Right wrist  Incision: Clean, dry, with sutures intact  Range of Motion:  Decreased range of motion as expected postoperatively, patient is unable to make a full composite fist  Neurovascular status: Neuro intact, good cap refill      STUDIES REVIEWED:  Images obtained today of the Right wrist 3 views demonstrate Fracture in stable alignment with hardware in place but no evidence of loosening  PROCEDURES PERFORMED:  Procedures  No Procedures performed today      ASSESSMENT/PLAN:    S/P right distal radius ORIF performed on 11/26/2019  Done today: Sutures out  - patient was instructed to work on aggressive range of motion on making a fist as she has difficulty with this  - she will continue OT to work on range of motion of her wrist  - she will obtain a volar wrist brace after her appointment to be worn at all times except for hygiene purposes and OT  - she will follow up in 6 weeks for re-evaluation of her range of motion and new x-rays at that time     FOLLOW UP:  Return in about 6 weeks (around 1/17/2020)      Work/school status:  No use of the right upper extremity      TO DO AT NEXT VISIT:  Re-evaluation of current issue, x-rays of right wrist

## 2019-12-06 NOTE — TELEPHONE ENCOUNTER
Called Shira back to set up new appt  And spoke to Treva Taylor was off  Per [facility] family was going to bring pt  To appt  Today   I advised [facility] we had canceled appt  Per Ventura Loges and no one had informed us that pt  Wanted to be placed on schedule  I  Checked  if the appt  Time was still available  Appt  Was still available so I reschedule for today same time   [facility] called family to make sure pt  Was being taken to appt  Today   Per family member pt  Was already on the way to appt

## 2019-12-06 NOTE — PROGRESS NOTES
OT Evaluation     Today's date: 2019  Patient name: Lilliana Gerber  : 1928  MRN: [id number]  Referring provider: Kelly Harris PA-C  Dx:   Encounter Diagnosis     ICD-10-CM    1  Closed fracture of distal ends of right radius and ulna with routine healing, subsequent encounter S52 501D     S52 601D                   Assessment  Assessment details: Patient presenting to OP OT services s/p right distal radius fracture  Patient reports symptoms occurred on 2019 when she slipped and fell when getting up from a chair  Patient was taken to the [address] [place] [address] ER and X-Rays were taken  Patient had initial Ortho appointment on 2019 and surgery completed by Dr Dana Rodriguez on 2019 with ORIF  Patient had follow-up Ortho appointment on 2019 with a referral to OP OT services  Patient's next follow-up appointment is on 2019  As per lauryn, patient will be staying at [facility] for sub-acute rehab for the next four weeks  Patient's niece will contact clinic once patient is discharged from [facility]  Impairments: abnormal coordination, abnormal or restricted ROM and impaired physical strength  Other impairment: decreased functional use    Symptom irritability: moderateBarriers to therapy: PMH: HTN, osteoporosis  Understanding of Dx/Px/POC: good   Prognosis: good    Goals  STGs    Pt will increase  strength by 5-10#  Pt will increase wrist strength by 1/2 grade  Pt will increase digit ROM by 50%  Pt will increase wrist ROM by 50%     Pt will demonstrate decrease in edema by 25%    Pt will report decrease in morning stiffness by 25%    Independent with HEP    Patient will verbalize understanding of splint rationale  - Met    Patient will demonstrate the ability to don and doff splint with I  - Met    Patient will verbalize understanding of splint care  - Met    Patient will verbalize understanding of splint wear schedule   - Met        LTGs     Pt will increase  strength by an additional 5-10#  Pt will increase wrist strength by 1-2 grade  Pt will increase digit ROM to WNL    Pt will increase wrist ROM to [facility]  Pt will demonstrate decrease in edema by 50%    Pt will increase pinch strength by 3-5#  Pt will report decrease in morning stiffness by 50%    Pt will report an increase in ADL/IADL participation        Plan  Plan details: Patient has presenting to OP OT s/p ORIF of right wrist  Patient demonstrating increased pain, decreased strength, decreased ROM and decreased activity  Pt would benefit from continued Occupational Therapy services to return to prior level of function and achieve all established goals   Thank you for the referral!    Patient would benefit from: OT eval, skilled occupational therapy and custom splinting  Referral necessary: Yes  Planned modality interventions: ultrasound, unattended electrical stimulation, thermotherapy: hydrocollator packs, cryotherapy and thermotherapy: paraffin bath  Planned therapy interventions: massage, manual therapy, joint mobilization, patient education, strengthening, stretching, fine motor coordination training, home exercise program, neuromuscular re-education, self care, therapeutic exercise and therapeutic activities  Frequency: 1x week  Duration in visits: 1  Duration in weeks: 1  Treatment plan discussed with: patient        Subjective Evaluation    History of Present Illness  Date of onset: 2019  Date of surgery: 2019  Mechanism of injury: surgery and trauma  Mechanism of injury: S/p ORIF of right wrist  Quality of life: good    Pain  Current pain ratin  At best pain ratin  At worst pain ratin  Location: Right Wrist  Quality: dull ache    Hand dominance: right      Diagnostic Tests  X-ray: abnormal  Treatments  Current treatment: occupational therapy  Patient Goals  Patient goals for therapy: decreased edema, decreased pain, increased motion, increased strength and independence with ADLs/IADLs          Objective     Observations     Right Wrist/Hand   Positive for incision       Additional Observation Details  Patient presenting with a 5 cm incision along volar aspect of right wrist with routine healing noted    Neurological Testing     Sensation     Wrist/Hand   Left   Intact: light touch    Right   Diminished: light touch    Additional Neurological Details  Patient reports minimal tingling in right hand    Active Range of Motion     Left Elbow   Normal active range of motion    Right Elbow   Normal active range of motion    Left Wrist   Normal active range of motion    Right Wrist   Wrist flexion: 10 degrees   Wrist extension: 38 degrees   Radial deviation: 0 degrees   Ulnar deviation: 25 degrees     Left Thumb   Opposition: WNL    Right Thumb   Opposition: Patient demonstrating the ability to complete opposition to 1st and 2nd digit    Additional Active Range of Motion Details  Patient demonstrating with decreased wrist ROM as compared to left  Soft composite fist noted with 6 cm distance from distal palmar crease    Strength/Myotome Testing     Left Elbow   Normal strength    Right Elbow   Flexion: 3+  Extension: 3+  Forearm supination: 3+  Forearm pronation: 3+    Left Wrist/Hand   Normal wrist strength     (2nd hand position)     Trial 1: 15    Right Wrist/Hand   Wrist extension: 2-  Wrist flexion: 2-  Radial deviation: 2-  Ulnar deviation: 2-     (2nd hand position)     Trial 1: 0    Additional Strength Details  Patient presenting with decreased elbow and forearm strength as compared to left  Patient demonstrating with decreased wrist and  strength of right hand    Swelling     Left Wrist/Hand   Circumference wrist: 15 cm    Right Wrist/Hand   Circumference wrist: 17 5 cm    Additional Swelling Details  Patient presenting with increased swelling throughout right wrist                 Subjective: "How long do I need to wear this "      Objective: See treatment diary below      Assessment: Tolerated treatment well  Patient exhibited good technique with therapeutic exercises and would benefit from continued OT  Therapist fabricated custom fitted volar wrist splint this session  Patient reports no problem areas at this time  Patient verbalized good fit without complaints  Patient educated on rationale, splint wear schedule, splint care and donning/doffing  Patient verbalized understanding of education  Patient instructed to contact clinic if issues arise regarding splint  Therapist answered all questions and concerns regarding splint this session  Follow-up prn  Plan: Continue per plan of care  Progress treatment as tolerated            Precautions N/A       Manual  12/06/2019       Edema Massage        Gentle Passive Stretching                                    Exercise Diary  12/06/2019       Wrist Flex        Wrist Ext        Wrist RD/UD        Sup/pronation        TGE        Opposition        Fist Pumps        Digi-Flex        Theraputty Grasps        Theraputty Pinches        Theraputty Intrinsic Pushes        Wrist Isometrics                                                                Splint Fabrication Volar Wrist Splint           Modalities 12/06/2019       JANET SANABRIA

## 2020-01-16 ENCOUNTER — APPOINTMENT (OUTPATIENT)
Dept: RADIOLOGY | Facility: CLINIC | Age: 85
End: 2020-01-16
Payer: COMMERCIAL

## 2020-01-16 ENCOUNTER — OFFICE VISIT (OUTPATIENT)
Dept: OBGYN CLINIC | Facility: CLINIC | Age: 85
End: 2020-01-16

## 2020-01-16 VITALS
HEIGHT: 62 IN | HEART RATE: 71 BPM | WEIGHT: 106 LBS | SYSTOLIC BLOOD PRESSURE: 185 MMHG | DIASTOLIC BLOOD PRESSURE: 77 MMHG | BODY MASS INDEX: 19.51 KG/M2

## 2020-01-16 DIAGNOSIS — Z48.89 AFTERCARE FOLLOWING SURGERY: ICD-10-CM

## 2020-01-16 DIAGNOSIS — Z48.89 AFTERCARE FOLLOWING SURGERY: Primary | ICD-10-CM

## 2020-01-16 PROCEDURE — 73110 X-RAY EXAM OF WRIST: CPT

## 2020-01-16 PROCEDURE — 99024 POSTOP FOLLOW-UP VISIT: CPT | Performed by: ORTHOPAEDIC SURGERY

## 2020-01-16 NOTE — PROGRESS NOTES
CHIEF COMPLAINT:  Chief Complaint   Patient presents with    Right Wrist - Post-op       SUBJECTIVE:  Rashi Ackerman is a 80y o  year old RHD female who presents for follow-up regarding right distal radius ORIF performed on 11/26/2019  Patient has been doing well with minimal discomfort  Patient states that she has been going to physical therapy however denies working on the wrist to work on range of motion  She is still at [address]  She denies any numbness or tingling  PAST MEDICAL HISTORY:  Past Medical History:   Diagnosis Date    Hypertension        PAST SURGICAL HISTORY:  Past Surgical History:   Procedure Laterality Date    CHOLECYSTECTOMY      HYSTERECTOMY      OR OPEN RX DISTAL RADIUS FX, INTRA-ARTICULAR, 3+ FRAG Right 11/26/2019    Procedure: RADIUS OPEN REDUCTION W/ INTERNAL FIXATION (ORIF);   Surgeon: Josh Shabazz MD;  [facility];  Service: Orthopedics       FAMILY HISTORY:  Family History   Problem Relation Age of Onset    Heart disease Father        SOCIAL HISTORY:  Social History     Tobacco Use    Smoking status: Never Smoker    Smokeless tobacco: Never Used   Substance Use Topics    Alcohol use: Never     Frequency: Never    Drug use: Never       MEDICATIONS:    Current Outpatient Medications:     acetaminophen (TYLENOL) 325 mg tablet, Take 2 tablets (650 mg total) by mouth every 6 (six) hours as needed for mild pain or fever, Disp: 30 tablet, Rfl: 0    LOSARTAN POTASSIUM PO, Take by mouth 2 (two) times a day , Disp: , Rfl:     ALLERGIES:  No Known Allergies    REVIEW OF SYSTEMS:  Review of Systems  ROS:   General: no fever, no chills  HEENT:  No loss of hearing or eyesight problems  Eyes:  No red eyes  Respiratory:  No coughing, shortness of breath or wheezing  Cardiovascular:  No chest pain, no palpitations  GI:  Abdomen soft nontender, denies nausea  Endocrine:  No muscle weakness, no frequent urination, no excessive thirst  Urinary:  No dysuria, no incontinence  Musculoskeletal: see HPI and PE  SKIN:  No skin rash, no dry skin  Neurological:  No headaches, no confusion  Psychiatric:  No suicide thoughts, no anxiety, no depression  Review of all other systems is negative    VITALS:  Vitals:    01/16/20 1302   BP: (!) 185/77   Pulse: 71       LABS:  HgA1c: No results found for: HGBA1C  BMP:   Lab Results   Component Value Date    CALCIUM 8 9 11/29/2019    K 4 3 11/29/2019    CO2 28 11/29/2019     11/29/2019    BUN 23 11/29/2019    CREATININE 0 88 11/29/2019       _____________________________________________________  PHYSICAL EXAMINATION:  General: well developed and well nourished, alert, oriented times 3 and appears comfortable  Psychiatric: Normal  HEENT: Trachea Midline, No torticollis  Pulmonary: No audible wheezing or respiratory distress   Skin: No masses, erythema, lacerations, fluctation, ulcerations  Neurovascular: Sensation Intact to the Median, Ulnar, Radial Nerve, Motor Intact to the Median, Ulnar, Radial Nerve and Pulses Intact    MUSCULOSKELETAL EXAMINATION:  Right wrist  No erythema edema or ecchymosis noted, skin is warm to touch  No tenderness to palpation over the fracture site  She has significantly decreased range of motion with wrist flexion and extension  There is no pain with axial loading  Patient is neurovascular intact    ___________________________________________________  STUDIES REVIEWED:  Images obtained today of the Right wrist 3 views demonstrate Fracture in stable alignment with visible fracture line  Hardware in place with no evidence of loosening        PROCEDURES PERFORMED:  Procedures  No Procedures performed today    _____________________________________________________  ASSESSMENT/PLAN:    S/P right distal radius ORIF performed on 11/26/2019  - patient was instructed to work on aggressive range of motion  - she was instructed to work on range of motion on her own as well  - she can take Tylenol as needed for pain  - she will follow up in 6 weeks for re-evaluation      Follow Up:  Return in about 6 weeks (around 2/27/2020)      Work/school status:  No restrictions    To Do Next Visit:  Re-evaluation of current issue, x-rays right wrist      Scribe Attestation    I,:   Maria Eastman PA-C am acting as a scribe while in the presence of the attending physician :        I,:   Humberto Stacy MD personally performed the services described in this documentation    as scribed in my presence :

## 2020-03-05 ENCOUNTER — APPOINTMENT (OUTPATIENT)
Dept: RADIOLOGY | Facility: CLINIC | Age: 85
End: 2020-03-05
Payer: COMMERCIAL

## 2020-03-05 ENCOUNTER — OFFICE VISIT (OUTPATIENT)
Dept: OBGYN CLINIC | Facility: CLINIC | Age: 85
End: 2020-03-05
Payer: COMMERCIAL

## 2020-03-05 VITALS
HEIGHT: 62 IN | DIASTOLIC BLOOD PRESSURE: 76 MMHG | BODY MASS INDEX: 19.51 KG/M2 | WEIGHT: 106 LBS | SYSTOLIC BLOOD PRESSURE: 170 MMHG | HEART RATE: 71 BPM

## 2020-03-05 DIAGNOSIS — S52.601D CLOSED FRACTURE OF DISTAL ENDS OF RIGHT RADIUS AND ULNA WITH ROUTINE HEALING, SUBSEQUENT ENCOUNTER: ICD-10-CM

## 2020-03-05 DIAGNOSIS — Z48.89 AFTERCARE FOLLOWING SURGERY: Primary | ICD-10-CM

## 2020-03-05 DIAGNOSIS — Z48.89 AFTERCARE FOLLOWING SURGERY: ICD-10-CM

## 2020-03-05 DIAGNOSIS — S52.501D CLOSED FRACTURE OF DISTAL ENDS OF RIGHT RADIUS AND ULNA WITH ROUTINE HEALING, SUBSEQUENT ENCOUNTER: ICD-10-CM

## 2020-03-05 PROCEDURE — 99213 OFFICE O/P EST LOW 20 MIN: CPT | Performed by: ORTHOPAEDIC SURGERY

## 2020-03-05 PROCEDURE — 73110 X-RAY EXAM OF WRIST: CPT

## 2020-03-05 NOTE — PROGRESS NOTES
CHIEF COMPLAINT:  Chief Complaint   Patient presents with    Right Wrist - Post-op       SUBJECTIVE:  Rufina Baker is a 80y o  year old RHD female who presents to the office for follow-up after right distal radius ORIF performed 11/26/2019  [facility] [employer]  Patient does not complain of pain in her right wrist or hand  Patient does have complaints of stiffness with motion of her fingers  PAST MEDICAL HISTORY:  Past Medical History:   Diagnosis Date    Hypertension        PAST SURGICAL HISTORY:  Past Surgical History:   Procedure Laterality Date    CHOLECYSTECTOMY      HYSTERECTOMY      MT OPEN RX DISTAL RADIUS FX, INTRA-ARTICULAR, 3+ FRAG Right 11/26/2019    Procedure: RADIUS OPEN REDUCTION W/ INTERNAL FIXATION (ORIF); Surgeon: Amber Olmstead MD;  Location: [place] [facility];  Service: Orthopedics       FAMILY HISTORY:  Family History   Problem Relation Age of Onset    Heart disease Father        SOCIAL HISTORY:  Social History     Tobacco Use    Smoking status: Never Smoker    Smokeless tobacco: Never Used   Substance Use Topics    Alcohol use: Never     Frequency: Never    Drug use: Never       MEDICATIONS:    Current Outpatient Medications:     acetaminophen (TYLENOL) 325 mg tablet, Take 2 tablets (650 mg total) by mouth every 6 (six) hours as needed for mild pain or fever, Disp: 30 tablet, Rfl: 0    LOSARTAN POTASSIUM PO, Take by mouth 2 (two) times a day , Disp: , Rfl:     ALLERGIES:  No Known Allergies    REVIEW OF SYSTEMS:  Review of Systems   Constitutional: Negative for chills, fever and unexpected weight change  HENT: Negative for hearing loss, nosebleeds and sore throat  Eyes: Negative for pain, redness and visual disturbance  Respiratory: Negative for cough, shortness of breath and wheezing  Cardiovascular: Negative for chest pain, palpitations and leg swelling  Gastrointestinal: Negative for abdominal pain, nausea and vomiting  Endocrine: Negative for polydipsia and polyuria  Genitourinary: Negative for dysuria and hematuria  Skin: Negative for rash and wound  Neurological: Negative for dizziness and headaches  Psychiatric/Behavioral: Negative for decreased concentration, dysphoric mood and suicidal ideas  The patient is not nervous/anxious          VITALS:  Vitals:    03/05/20 1038   BP: 170/76   Pulse: 71       LABS:  HgA1c: No results found for: HGBA1C  BMP:   Lab Results   Component Value Date    CALCIUM 8 9 11/29/2019    K 4 3 11/29/2019    CO2 28 11/29/2019     11/29/2019    BUN 23 11/29/2019    CREATININE 0 88 11/29/2019       _____________________________________________________  PHYSICAL EXAMINATION:  General: well developed and well nourished, alert, oriented times 3 and appears comfortable  Psychiatric: Normal  HEENT: Trachea Midline, No torticollis  Pulmonary: No audible wheezing or strider  Cardiovascular: No discernable arrhythmia   Skin: No masses, erythema, lacerations, fluctation, ulcerations  Neurovascular: Sensation Intact to the Median, Ulnar, Radial Nerve, Motor Intact to the Median, Ulnar, Radial Nerve and Pulses Intact    MUSCULOSKELETAL EXAMINATION:  Right wrist and hand  Limited flexion of the wrist when compared to contralateral side  Stiff motion of the fingers  Dupuytren cords on all finger rays with the exception of small finger as well as in 1st web space  No obvious contractures from Dupuytren's    ___________________________________________________  STUDIES REVIEWED:  X-rays of the right wrist performed today show orthopedic hardware intact, maintained stable alignment distal radius no visible fracture lines      PROCEDURES PERFORMED:  Procedures  No Procedures performed today    _____________________________________________________  ASSESSMENT/PLAN:  right distal radius ORIF performed 11/26/2019- stiff finger motion  * patient was advised to actively work on motion of the wrist and fingers as specifically shown  * patient was advised that some of the stiffness could be due to arthritis    Dupuytren's disease of right palm  * patient was advised that nothing needs to be done with this at this time although we will continue to monitor for contractures    Diagnoses and all orders for this visit:    Aftercare following surgery  -     XR wrist 3+ vw right; Future    Closed fracture of distal ends of right radius and ulna with routine healing, subsequent encounter        Follow Up:  Return if symptoms worsen or fail to improve          To Do Next Visit:  Re-evaluation of current issue        Scribe Attestation    I,:   Trace Chauhan am acting as a scribe while in the presence of the attending physician :        I,:   Ean Patel MD personally performed the services described in this documentation    as scribed in my presence :

## 2020-03-24 NOTE — PROGRESS NOTES
OT DISCHARGE    Patient has Inconsistent attended OP OT services since start of care  Patient has attended 1 visits since start of care  Patient last missed visit/visit was on 12/06/2019  Patient continued therapy services at [facility] home  Patient did not return to OP OT services at [place] [address]  Thank you for the referral  Please refer to patient's last assessment for most recent objective measurements

## 2024-12-05 NOTE — OCCUPATIONAL THERAPY NOTE
12/03/19 1130   Restrictions/Precautions   Weight Bearing Precautions Per Order Yes   RUE Weight Bearing Per Order NWB   Braces or Orthoses Sling   Other Precautions Fall Risk   Lifestyle   Reciprocal Relationships reports "someone planted 'this' so I would fall -- but how do you prove it ??"   (states niece will assist/visit her at home )   Intrinsic Gratification black lab - tearful regarding missing her dog    Pain Assessment   Pain Assessment No/denies pain   ADL   Where Assessed Chair   Grooming Assistance 5  Supervision/Setup   Grooming Deficit Setup;Verbal cueing; Increased time to complete   Grooming Comments patient combed hair; she deferred brushing teeth till after eating lunch    UB Bathing Assistance 4  Minimal Assistance   UB Bathing Deficit Setup;Verbal cueing;Supervision/safety; Increased time to complete;Left arm   LB Bathing Assistance 4  Minimal Assistance   LB Bathing Deficit Setup;Steadying;Verbal cueing;Supervision/safety; Increased time to complete   UB Dressing Assistance 4  Minimal Assistance   UB Dressing Deficit Setup;Verbal cueing; Thread RUE; Thread LUE;Other (Comment)  (hospital gown used)   LB Dressing Assistance 3  Moderate Assistance   LB Dressing Deficit Setup; Other (Comment)  (doffs socks without difficulty; Max  Assist  for donning )   LB Dressing Comments pants deferred at this time    Toileting Comments utilized bedpan prior to beginning bathing    Bed Mobility   Rolling L 5  Supervision   Additional items Bedrails  (With Left hand)   Supine to Sit 4  Minimal assistance   Additional items Assist x 1;HOB elevated; Bedrails; Increased time required;Verbal cues   Transfers   Sit to Stand 5  Supervision   Additional items Armrests; Verbal cues   Stand to Sit 5  Supervision   Additional items Armrests; Verbal cues   Stand pivot 4  Minimal assistance  (from bed to chair)   Additional items Assist x 1;Verbal cues   Therapeutic Exercise - ROM   UE-ROM   (brief instruction in ROM exer   for R wrist and digits)   Coordination   Gross Motor RUE impaired/restricted per precautions   Dexterity RUE impaired secondary to current edema   Cognition   Overall Cognitive Status WFL   Arousal/Participation Alert; Cooperative   Memory Decreased short term memory  (some repetitive speech and forgetfulness noted)   Following Commands Follows one step commands without difficulty   Activity Tolerance   Activity Tolerance Patient tolerated treatment well  (and without complaint; is appreciative of all services/staff)   Assessment   Assessment Patient participated in Skilled OT session this date with interventions consisting of ADL re training with the use of correct body mechnaics, safety awareness and fall prevention techniques, one handed dressing technique and  therapeutic activities to: increase activity tolerance   Also initiated ROM wrist and hand RUE to promote decreased edema  Patient agreeable to OT treatment session, upon arrival patient was found supine in bed (she readily agreed to self-care session out of bed)  Patient requiring verbal cues for correct technique and ocassional safety reminders - she does not seem to recall the purpose of the sling, uses/moves arm readily  Patient continues to be functioning below baseline level, occupational performance remains limited secondary to factors listed above and increased risk for falls and injury  From OT standpoint, recommendation at time of d/c would be Short Term Rehab, as patient wants to return home when able  Patient to benefit from continued Occupational Therapy treatment while in the hospital to address deficits as defined above and maximize level of functional independence with ADLs and functional mobility  Plan   Treatment Interventions ADL retraining;Functional transfer training;UE strengthening/ROM; Patient/family training; Compensatory technique education   Goal Expiration Date 12/11/19   OT Treatment Day 1   DAVID Garcia    ** UE ROM: exer  For R Digits Only I personally spent

## (undated) DEVICE — LIGHT HANDLE COVER SLEEVE DISP BLUE STELLAR

## (undated) DEVICE — PAD CAST 3 IN COTTON NON STRL

## (undated) DEVICE — DRAPE KIT C-ARM W/PLATE PRTC FOOT SWITCH COVER

## (undated) DEVICE — GAUZE SPONGES,16 PLY: Brand: CURITY

## (undated) DEVICE — 2.0MM QUICK RELEASE DRILL: Brand: ACUMED

## (undated) DEVICE — 3.5MM X 10.0MM LOCKING CORTICAL SCREW
Type: IMPLANTABLE DEVICE | Site: WRIST | Status: NON-FUNCTIONAL
Brand: ACUMED

## (undated) DEVICE — CURITY NON-ADHERENT STRIPS: Brand: CURITY

## (undated) DEVICE — INTENDED FOR TISSUE SEPARATION, AND OTHER PROCEDURES THAT REQUIRE A SHARP SURGICAL BLADE TO PUNCTURE OR CUT.: Brand: BARD-PARKER ® CARBON RIB-BACK BLADES

## (undated) DEVICE — SPONGE SCRUB 4 PCT CHLORHEXIDINE

## (undated) DEVICE — SUT ETHIBOND 3-0 RB-1 30 IN MX552

## (undated) DEVICE — GLOVE SRG BIOGEL 8

## (undated) DEVICE — STERILE BETHLEHEM PLASTIC HAND: Brand: CARDINAL HEALTH

## (undated) DEVICE — 2.8MM X 5" QUICK RELEASE DRILL: Brand: ACUMED

## (undated) DEVICE — .054" X 6" GUIDE WIRE: Brand: ACUMED

## (undated) DEVICE — PENCIL ELECTROSURG E-Z CLEAN -0035H

## (undated) DEVICE — SUT ETHILON 4-0 PS-2 18 IN 1667H

## (undated) DEVICE — ARM SLING: Brand: DEROYAL

## (undated) DEVICE — BANDAGE, ESMARK LF STR 6"X9' (20/CS): Brand: CYPRESS

## (undated) DEVICE — STRETCH BANDAGE: Brand: CURITY

## (undated) DEVICE — ACE WRAP 3 IN STERILE

## (undated) DEVICE — SPLINT 4 X 15 IN FAST SET PLASTER

## (undated) DEVICE — TUBING SUCTION 5MM X 12 FT